# Patient Record
Sex: FEMALE | NOT HISPANIC OR LATINO | Employment: UNEMPLOYED | URBAN - METROPOLITAN AREA
[De-identification: names, ages, dates, MRNs, and addresses within clinical notes are randomized per-mention and may not be internally consistent; named-entity substitution may affect disease eponyms.]

---

## 2017-01-03 ENCOUNTER — ROUTINE PRENATAL (OUTPATIENT)
Dept: OBSTETRICS AND GYNECOLOGY | Facility: CLINIC | Age: 27
End: 2017-01-03

## 2017-01-03 VITALS — BODY MASS INDEX: 28.74 KG/M2 | SYSTOLIC BLOOD PRESSURE: 106 MMHG | WEIGHT: 189 LBS | DIASTOLIC BLOOD PRESSURE: 64 MMHG

## 2017-01-03 DIAGNOSIS — Z34.83 NORMAL PREGNANCY IN MULTIGRAVIDA IN THIRD TRIMESTER: Primary | ICD-10-CM

## 2017-01-03 LAB — EXTERNAL GROUP B STREP ANTIGEN: NORMAL

## 2017-01-03 PROCEDURE — 99213 OFFICE O/P EST LOW 20 MIN: CPT | Performed by: OBSTETRICS & GYNECOLOGY

## 2017-01-03 NOTE — PROGRESS NOTES
No major complaints.  GBS performed today.  Discussed labor signs.  Return to the office in 1 week to see Dr. Dominguez or Dr. Ch.  Return to the office in 2 weeks to see me.  I spent 12 out of 15 minutes with the patient in face to face counseling of the above issues.

## 2017-01-03 NOTE — MR AVS SNAPSHOT
Sania Monroe   1/3/2017 11:00 AM   Routine Prenatal    Dept Phone:  [phone]   Encounter #:  [id number]    Provider:  Olegario Dumont MD   Department:  [facility] OB GYN                Your Full Care Plan              Your Updated Medication List          This list is accurate as of: 1/3/17 11:19 AM.  Always use your most recent med list.                DOCQLACE 100 MG capsule   Generic drug:  docusate sodium       Omeprazole 20 MG tablet delayed-release   Take 20 mg by mouth daily.       Prenatal Vitamins 28-0.8 MG tablet               We Performed the Following     Group B Strep Culture       You Were Diagnosed With        Codes Comments    Normal pregnancy in multigravida in third trimester    -  Primary ICD-10-CM: Z34.83  ICD-9-CM: V22.1       Instructions     None    Patient Instructions History      Upcoming Appointments     Visit Type Date Time Department    OB FOLLOWUP 1/3/2017 11:00 AM [facility] Taiwanese    OB FOLLOWUP 2017  1:15 PM [facility] L[facility] Taiwanese    OB FOLLOWUP 2017  1:45 PM [facility] Taiwanese      [employer] Signup     [facility] Oncothyreon allows you to send messages to your doctor, view your test results, renew your prescriptions, schedule appointments, and more. To sign up, go to [employer] and click on the Sign Up Now link in the New User? box. Enter your Oncothyreon Activation Code exactly as it appears below along with the last four digits of your Social Security Number and your Date of Birth () to complete the sign-up process. If you do not sign up before the expiration date, you must request a new code.    Oncothyreon Activation Code: [email or web address]  Expires: 2017  1:39 PM    If you have questions, you can email [email or web address] or call [phone] to talk to our Oncothyreon staff. Remember, Oncothyreon is NOT to be used for urgent needs. For medical emergencies, dial 911.                  Other Info from Your Visit           Your Appointments     Jan 11, 2017  1:15 PM EST   OB FOLLOWUP with Magnolia Ch MD   [facility] OB [facility]YN (--)    [address]  [facility] [id number] [phone]            Jan 18, 2017  1:45 PM EST   OB FOLLOWUP with Olegario Dumont MD   [facility] (--)    [address]  [facility] [id number] [phone]              Allergies     No Known Allergies      Vital Signs     Blood Pressure Weight Last Menstrual Period Body Mass Index Smoking Status       106/64 189 lb (85.7 kg) 04/22/2016 28.74 kg/m2 Never Smoker       Problems and Diagnoses Noted     Normal pregnancy in multigravida in third trimester

## 2017-01-07 LAB — B-HEM STREP SPEC QL CULT: NEGATIVE

## 2017-01-11 ENCOUNTER — ROUTINE PRENATAL (OUTPATIENT)
Dept: OBSTETRICS AND GYNECOLOGY | Facility: CLINIC | Age: 27
End: 2017-01-11

## 2017-01-11 VITALS — WEIGHT: 193 LBS | SYSTOLIC BLOOD PRESSURE: 130 MMHG | BODY MASS INDEX: 29.35 KG/M2 | DIASTOLIC BLOOD PRESSURE: 71 MMHG

## 2017-01-11 DIAGNOSIS — Z34.83 NORMAL PREGNANCY IN MULTIGRAVIDA IN THIRD TRIMESTER: Primary | ICD-10-CM

## 2017-01-11 PROCEDURE — 99212 OFFICE O/P EST SF 10 MIN: CPT | Performed by: OBSTETRICS & GYNECOLOGY

## 2017-01-11 NOTE — MR AVS SNAPSHOT
Sania Monroe   2017 1:15 PM   Routine Prenatal    Dept Phone:  [phone]   Encounter #:  [id number]    Provider:  Magnolia Ch MD   Department:  [facility] OB GYN                Your Full Care Plan              Your Updated Medication List          This list is accurate as of: 17  4:13 PM.  Always use your most recent med list.                DOCQLACE 100 MG capsule   Generic drug:  docusate sodium       Omeprazole 20 MG tablet delayed-release   Take 20 mg by mouth daily.       Prenatal Vitamins 28-0.8 MG tablet               You Were Diagnosed With        Codes Comments    Normal pregnancy in multigravida in third trimester    -  Primary ICD-10-CM: Z34.83  ICD-9-CM: V22.1       Instructions     None    Patient Instructions History      Upcoming Appointments     Visit Type Date Time Department    OB FOLLOWUP 2017  1:15 PM [facility]    OB FOLLOWUP 2017  1:45 PM [facility]      [employer] Signup     [facility] DancingAnchovy allows you to send messages to your doctor, view your test results, renew your prescriptions, schedule appointments, and more. To sign up, go to [employer] and click on the Sign Up Now link in the New User? box. Enter your DancingAnchovy Activation Code exactly as it appears below along with the last four digits of your Social Security Number and your Date of Birth () to complete the sign-up process. If you do not sign up before the expiration date, you must request a new code.    DancingAnchovy Activation Code: [email or web address]  Expires: 2017  4:13 PM    If you have questions, you can email [email or web address] or call [phone] to talk to our DancingAnchovy staff. Remember, DancingAnchovy is NOT to be used for urgent needs. For medical emergencies, dial 911.               Other Info from Your Visit           Your Appointments     2017  1:45 PM EST   OB FOLLOWUP with Olegario Gore  MD Tim   [facility] (--)    [address]  [facility] [id number] [phone]              Allergies     No Known Allergies      Vital Signs     Blood Pressure Weight Last Menstrual Period Body Mass Index Smoking Status       130/71 193 lb (87.5 kg) 04/22/2016 29.35 kg/m2 Never Smoker       Problems and Diagnoses Noted     Normal pregnancy in multigravida in third trimester

## 2017-01-11 NOTE — PROGRESS NOTES
Complaining of being uncomfortable.  Discussed normal aches and pains of pregnancy.  Discussed labor precautions with patient.  Reports good fetal movement.  GBS negative.

## 2017-01-17 ENCOUNTER — HOSPITAL ENCOUNTER (OUTPATIENT)
Facility: HOSPITAL | Age: 27
Setting detail: OBSERVATION
Discharge: HOME | End: 2017-01-17
Attending: OBSTETRICS & GYNECOLOGY | Admitting: OBSTETRICS & GYNECOLOGY

## 2017-01-17 VITALS
HEART RATE: 74 BPM | BODY MASS INDEX: 29.46 KG/M2 | DIASTOLIC BLOOD PRESSURE: 75 MMHG | WEIGHT: 194.4 LBS | HEIGHT: 68 IN | RESPIRATION RATE: 16 BRPM | TEMPERATURE: 98.4 F | OXYGEN SATURATION: 99 % | SYSTOLIC BLOOD PRESSURE: 122 MMHG

## 2017-01-17 PROBLEM — Z34.90 PREGNANCY: Status: ACTIVE | Noted: 2017-01-17

## 2017-01-17 PROCEDURE — 59025 FETAL NON-STRESS TEST: CPT

## 2017-01-17 PROCEDURE — G0378 HOSPITAL OBSERVATION PER HR: HCPCS

## 2017-01-17 PROCEDURE — 59025 FETAL NON-STRESS TEST: CPT | Performed by: OBSTETRICS & GYNECOLOGY

## 2017-01-17 NOTE — NON STRESS TEST
Sania Monroe, a  at 38w4d with an MERARY of 2017, by Other Basis, was seen at [facility] DELIVERY for a nonstress test.    Chief Complaint   Patient presents with   • Vaginal Bleeding     bright red bleeding since yesterday       Interpretation A  Nonstress Test Interpretation A: Reactive (17 1230 : Columba Borja RN)

## 2017-01-17 NOTE — NURSING NOTE
Patient provided discharge instructions, and verbalizes understanding. Aware to return to triage for changes or concerns. Encouraged to keep prenatal appointment tomorrow. Labor precautions reviewed. Patient appears comfortable and ambulated off unit.

## 2017-01-17 NOTE — IP AVS SNAPSHOT
AFTER VISIT SUMMARY             Sania Monroe           About your hospitalization     You were discharged on:  January 17, 2017 You last received care in the:  [facility] LAB[facility] DELIVERY     Unit phone number:  [phone]       Medications    If you or your caregiver advised us that you are currently taking a medication and that medication is marked below as “Resume”, this simply indicates that we have reviewed those medications to make sure our new therapy recommendations do not interfere.  If you have concerns about medications other than those new ones which we are prescribing today, please consult the physician who prescribed them (or your primary physician).  Our review of your home medications is not meant to indicate that we are directing their use.             Your Medications      ASK your doctor about these medications           Morning Noon Evening Bedtime As Needed    DOCQLACE 100 MG capsule   Generic drug:  docusate sodium                                Omeprazole 20 MG tablet delayed-release   Take 20 mg by mouth daily.                                Prenatal Vitamins 28-0.8 MG tablet                                         Instructions for After Discharge        Scheduled Appointments     Jan 18, 2017  1:45 PM EST   OB FOLLOWUP with Olegario Dumont MD   [facility] OB GYN (--)    [address]  [facility] [id number] [phone]               Relevant Prenatal Information          Facts About Your Prenatal Visit (All Dating Information Is Approximate)     Due Date How Far Along Am I? Weight Gain Since Prior Visit (1/11/2017)       1/27/2017 38 weeks 4 days 1.4 lb (0.6 kg)       Vitals     Blood Pressure Weight                122/75 194.4 lb (88.2 kg)           Summary of Your Hospitalization        Reason for Hospitalization     Your primary diagnosis was:  Not on File    Your diagnoses also included:  Pregnancy      Care Providers      Provider Service Role Specialty    Olegario Dumont MD Obstetrics Attending Provider Obstetrics and Gynecology      Your Allergies  Date Reviewed: 2017    No active allergies      RADLIVE Signup     [employer] allows you to send messages to your doctor, view your test results, renew your prescriptions, schedule appointments, and more. To sign up, go to [employer] and click on the Sign Up Now link in the New User? box. Enter your RADLIVE Activation Code exactly as it appears below along with the last four digits of your Social Security Number and your Date of Birth () to complete the sign-up process. If you do not sign up before the expiration date, you must request a new code.    RADLIVE Activation Code: [email or web address]  Expires: 2017  4:13 PM    If you have questions, you can email [email or web address] or call [phone] to talk to our RADLIVE staff. Remember, RADLIVE is NOT to be used for urgent needs. For medical emergencies, dial 911.          Information Regarding Fetal Kick Counts     Fetal Movement Counts  Patient Name: __________________________________________________   Patient Due Date: ____________________  Performing a fetal movement count is highly recommended in high-risk pregnancies, but it is good for every pregnant woman to do. Your caregiver may ask you to start counting fetal movements at 28 weeks of the pregnancy. Fetal movements often increase:  · After eating a full meal.  · After physical activity.  · After eating or drinking something sweet or cold.  · At rest.  Pay attention to when you feel the baby is most active. This will help you notice a pattern of your baby's sleep and wake cycles and what factors contribute to an increase in fetal movement. It is important to perform a fetal movement count at the same time each day when your baby is normally most active.    HOW TO COUNT FETAL MOVEMENTS  1. Find a quiet and  comfortable area to sit or lie down on your left side. Lying on your left side provides the best blood and oxygen circulation to your baby.  2. Write down the day and time on a sheet of paper or in a journal.  3. Start counting kicks, flutters, swishes, rolls, or jabs in a 2 hour period. You should feel at least 10 movements within 2 hours.  4. If you do not feel 10 movements in 2 hours, wait 2-3 hours and count again. Look for a change in the pattern or not enough counts in 2 hours.  SEEK MEDICAL CARE IF:  · You feel less than 10 counts in 2 hours, tried twice.  · There is no movement in over an hour.  · The pattern is changing or taking longer each day to reach 10 counts in 2 hours.  · You feel the baby is not moving as he or she usually does.    Date  Movements  Start Time  Finish Time    Date  Movements  Start Time  Finish Time    Date  Movements  Start Time  Finish Time    Date  Movements  Start Time  Finish Time    Date  Movements  Start Time  Finish Time    Date  Movements  Start Time  Finish Time      Date  Movements  Start Time  Finish Time    Date  Movements  Start Time  Finish Time    Date  Movements  Start Time  Finish Time    Date  Movements  Start Time  Finish Time    Date  Movements  Start Time  Finish Time    Date  Movements  Start Time  Finish Time      Date  Movements  Start Time  Finish Time    Date  Movements  Start Time  Finish Time    Date  Movements  Start Time  Finish Time    Date  Movements  Start Time  Finish Time    Date  Movements  Start Time  Finish Time    Date  Movements  Start Time  Finish Time    Date  Movements  Start Time  Finish Time    Date  Movements  Start Time  Finish Time    Date  Movements  Start Time  Finish Time    Date  Movements  Start Time  Finish Time    Date  Movements  Start Time  Finish Time    Date  Movements  Start Time  Finish Time      Document Released: 01/17/2008   Document Revised: 12/04/2013   Document Reviewed: 10/14/2013    ExitCare® Patient Information  ©2015 [facility], [facility]. This information is not intended to replace advice given to you by your health care provider. Make sure you discuss any questions you have with your health care provider.            More Information      Third Trimester of Pregnancy  The third trimester is from week 29 through week 42, months 7 through 9. This trimester is when your unborn baby (fetus) is growing very fast. At the end of the ninth month, the unborn baby is about 20 inches in length. It weighs about 6-10 pounds.   HOME CARE   · Avoid all smoking, herbs, and alcohol. Avoid drugs not approved by your doctor.  · Do not use any tobacco products, including cigarettes, chewing tobacco, and electronic cigarettes. If you need help quitting, ask your doctor. You may get counseling or other support to help you quit.  · Only take medicine as told by your doctor. Some medicines are safe and some are not during pregnancy.  · Exercise only as told by your doctor. Stop exercising if you start having cramps.  · Eat regular, healthy meals.  · Wear a good support bra if your breasts are tender.  · Do not use hot tubs, steam rooms, or saunas.  · Wear your seat belt when driving.  · Avoid raw meat, uncooked cheese, and liter boxes and soil used by cats.  · Take your prenatal vitamins.  · Take 2216-0026 milligrams of calcium daily starting at the 20th week of pregnancy until you deliver your baby.  · Try taking medicine that helps you poop (stool softener) as needed, and if your doctor approves. Eat more fiber by eating fresh fruit, vegetables, and whole grains. Drink enough fluids to keep your pee (urine) clear or pale yellow.  · Take warm water baths (sitz baths) to soothe pain or discomfort caused by hemorrhoids. Use hemorrhoid cream if your doctor approves.  · If you have puffy, bulging veins (varicose veins), wear support hose. Raise (elevate) your feet for 15 minutes, 3-4 times a day. Limit salt in your diet.  · Avoid heavy lifting, wear low  heels, and sit up straight.  · Rest with your legs raised if you have leg cramps or low back pain.  · Visit your dentist if you have not gone during your pregnancy. Use a soft toothbrush to brush your teeth. Be gentle when you floss.  · You can have sex (intercourse) unless your doctor tells you not to.  · Do not travel far distances unless you must. Only do so with your doctor's approval.  · Take prenatal classes.  · Practice driving to the hospital.  · Pack your hospital bag.  · Prepare the baby's room.  · Go to your doctor visits.  GET HELP IF:  · You are not sure if you are in labor or if your water has broken.  · You are dizzy.  · You have mild cramps or pressure in your lower belly (abdominal).  · You have a nagging pain in your belly area.  · You continue to feel sick to your stomach (nauseous), throw up (vomit), or have watery poop (diarrhea).  · You have bad smelling fluid coming from your vagina.  · You have pain with peeing (urination).  GET HELP RIGHT AWAY IF:   · You have a fever.  · You are leaking fluid from your vagina.  · You are spotting or bleeding from your vagina.  · You have severe belly cramping or pain.  · You lose or gain weight rapidly.  · You have trouble catching your breath and have chest pain.  · You notice sudden or extreme puffiness (swelling) of your face, hands, ankles, feet, or legs.  · You have not felt the baby move in over an hour.  · You have severe headaches that do not go away with medicine.  · You have vision changes.     This information is not intended to replace advice given to you by your health care provider. Make sure you discuss any questions you have with your health care provider.     Document Released: 03/14/2011 Document Revised: 01/08/2016 Document Reviewed: 02/18/2014  [employer] Interactive Patient Education ©2016 [employer]          Vaginal Bleeding During Pregnancy, Third Trimester   A small amount of bleeding (spotting) from the vagina is common in  pregnancy. Sometimes the bleeding is normal and is not a problem, and sometimes it is a sign of something serious. Be sure to tell your doctor about any bleeding from your vagina right away.  HOME CARE  · Watch your condition for any changes.  · Follow your doctor's instructions about how active you can be.  · If you are on bed rest:    You may need to stay in bed and only get up to use the bathroom.    You may be allowed to do some activities.    If you need help, make plans for someone to help you.  · Write down:    The number of pads you use each day.    How often you change pads.    How soaked (saturated) your pads are.  · Do not use tampons.  · Do not douche.  · Do not have sex or orgasms until your doctor says it is okay.  · Follow your doctor's advice about lifting, driving, and doing physical activities.  · If you pass any tissue from your vagina, save the tissue so you can show it to your doctor.  · Only take medicines as told by your doctor.  · Do not take aspirin because it can make you bleed.  · Keep all follow-up visits as told by your doctor.  GET HELP IF:   · You bleed from your vagina.  · You have cramps.  · You have labor pains.  · You have a fever that does not go away after you take medicine.  GET HELP RIGHT AWAY IF:  · You have very bad cramps in your back or belly (abdomen).  · You have chills.  · You have a gush of fluid from your vagina.  · You pass large clots or tissue from your vagina.  · You bleed more.  · You feel light-headed or weak.  · You pass out (faint).  · You do not feel your baby move around as much as before.  MAKE SURE YOU:  · Understand these instructions.  · Will watch your condition.  · Will get help right away if you are not doing well or get worse.     This information is not intended to replace advice given to you by your health care provider. Make sure you discuss any questions you have with your health care provider.     Document Released: 05/04/2015 Document Reviewed:  05/04/2015  [employer] Interactive Patient Education ©2016 [employer]            SYMPTOMS OF A STROKE    Call 911 or have someone take you to the Emergency Department if you have any of the following:    · Sudden numbness or weakness of your face, arm or leg especially on one side of the body  · Sudden confusion, diffiiculty speaking or trouble understanding   · Changes in your vision or loss of sight in one eye  · Sudden severe headache with no known cause  · sudden dizziness, trouble walking, loss of balance or coordination    It is important to seek emergency care right away if you have further stroke symptoms. If you get emergency help quickly, the powerful clot-dissolving medicines can reduce the disabilities caused by a stroke.     For more information:    American Stroke Association  0-783-5-STROKE  www.strokeassociation.org           IF YOU SMOKE OR USE TOBACCO PLEASE READ THE FOLLOWING:    Why is smoking bad for me?  Smoking increases the risk of heart disease, lung disease, vascular disease, stroke, and cancer.     If you smoke, STOP!    If you would like more information on quitting smoking, please visit the [employer] website: [email or web address]/[email or web address]/healthier-together/smoke   This link will provide additional resources including the QUIT line and the Beat the Pack support groups.     For more information:    American Cancer Society  ([phone] 300-3068    American Heart Association  1-210.769.9395               YOU ARE THE MOST IMPORTANT FACTOR IN YOUR RECOVERY.     Follow all instructions carefully.     I have reviewed my discharge instructions with my nurse, including the following information, if applicable:     Information about my illness and diagnosis   Follow up appointments (including lab draws)   Wound Care   Equipment Needs   Medications (new and continuing) along with side effects   Preventative information such as vaccines and smoking cessations   Diet   Pain   I know when to  contact my Doctor's office or seek emergency care      I want my nurse to describe the side effects of my medications: YES NO   If the answer is no, I understand the side effects of my medications: YES NO   My nurse described the side effects of my medications in a way that I could understand: YES NO   I have taken my personal belongings and my own medications with me at discharge: YES NO            Should a home visit be schedule with you:  a notification from your provider will be made prior to the visit.  If you have any questions or concerns about the visit, contact your provider.      I have received this information and my questions have been answered. I have discussed any concerns I see with this plan with the nurse or physician. I understand these instructions.    Signature of Patient or Responsible Person: _____________________________________    Date: _________________  Time: __________________    Signature of Healthcare Provider: _______________________________________  Date: _________________  Time: __________________          Additional Information     I have reviewed the patient condition and status with the provider and a discharge order was obtained.  I hereby state that the patient has been examined and observed for a reasonable period of time and I certify that the patient is in false labor.    RN Name ___________________________________________    Date and Time _______________________________________

## 2017-01-18 ENCOUNTER — ANESTHESIA (OUTPATIENT)
Dept: LABOR AND DELIVERY | Facility: HOSPITAL | Age: 27
End: 2017-01-18

## 2017-01-18 ENCOUNTER — HOSPITAL ENCOUNTER (INPATIENT)
Facility: HOSPITAL | Age: 27
LOS: 2 days | Discharge: HOME | End: 2017-01-20
Attending: OBSTETRICS & GYNECOLOGY | Admitting: OBSTETRICS & GYNECOLOGY

## 2017-01-18 ENCOUNTER — ANESTHESIA EVENT (OUTPATIENT)
Dept: LABOR AND DELIVERY | Facility: HOSPITAL | Age: 27
End: 2017-01-18

## 2017-01-18 LAB
ABO GROUP BLD: NORMAL
BASOPHILS # BLD AUTO: 0.03 10*3/MM3 (ref 0–0.2)
BASOPHILS NFR BLD AUTO: 0.2 % (ref 0–1.5)
BLD GP AB SCN SERPL QL: NEGATIVE
DEPRECATED RDW RBC AUTO: 44.8 FL (ref 37–54)
EOSINOPHIL # BLD AUTO: 0.11 10*3/MM3 (ref 0–0.7)
EOSINOPHIL NFR BLD AUTO: 0.6 % (ref 0.3–6.2)
ERYTHROCYTE [DISTWIDTH] IN BLOOD BY AUTOMATED COUNT: 12.6 % (ref 11.7–13)
HCT VFR BLD AUTO: 37 % (ref 35.6–45.5)
HGB BLD-MCNC: 12.3 G/DL (ref 11.9–15.5)
IMM GRANULOCYTES # BLD: 0.08 10*3/MM3 (ref 0–0.03)
IMM GRANULOCYTES NFR BLD: 0.4 % (ref 0–0.5)
LYMPHOCYTES # BLD AUTO: 1.82 10*3/MM3 (ref 0.9–4.8)
LYMPHOCYTES NFR BLD AUTO: 9.7 % (ref 19.6–45.3)
MCH RBC QN AUTO: 32.6 PG (ref 26.9–32)
MCHC RBC AUTO-ENTMCNC: 33.2 G/DL (ref 32.4–36.3)
MCV RBC AUTO: 98.1 FL (ref 80.5–98.2)
MONOCYTES # BLD AUTO: 1.61 10*3/MM3 (ref 0.2–1.2)
MONOCYTES NFR BLD AUTO: 8.6 % (ref 5–12)
NEUTROPHILS # BLD AUTO: 15.05 10*3/MM3 (ref 1.9–8.1)
NEUTROPHILS NFR BLD AUTO: 80.5 % (ref 42.7–76)
PLATELET # BLD AUTO: 260 10*3/MM3 (ref 140–500)
PMV BLD AUTO: 9.9 FL (ref 6–12)
RBC # BLD AUTO: 3.77 10*6/MM3 (ref 3.9–5.2)
RH BLD: POSITIVE
WBC NRBC COR # BLD: 18.7 10*3/MM3 (ref 4.5–10.7)

## 2017-01-18 PROCEDURE — 85025 COMPLETE CBC W/AUTO DIFF WBC: CPT | Performed by: OBSTETRICS & GYNECOLOGY

## 2017-01-18 PROCEDURE — 86850 RBC ANTIBODY SCREEN: CPT

## 2017-01-18 PROCEDURE — 0UQMXZZ REPAIR VULVA, EXTERNAL APPROACH: ICD-10-PCS | Performed by: OBSTETRICS & GYNECOLOGY

## 2017-01-18 PROCEDURE — C1755 CATHETER, INTRASPINAL: HCPCS

## 2017-01-18 PROCEDURE — 25010000002 BUTORPHANOL PER 1 MG: Performed by: OBSTETRICS & GYNECOLOGY

## 2017-01-18 PROCEDURE — 86900 BLOOD TYPING SEROLOGIC ABO: CPT

## 2017-01-18 PROCEDURE — 0HQ9XZZ REPAIR PERINEUM SKIN, EXTERNAL APPROACH: ICD-10-PCS | Performed by: OBSTETRICS & GYNECOLOGY

## 2017-01-18 PROCEDURE — 59409 OBSTETRICAL CARE: CPT | Performed by: OBSTETRICS & GYNECOLOGY

## 2017-01-18 PROCEDURE — 86901 BLOOD TYPING SEROLOGIC RH(D): CPT

## 2017-01-18 RX ORDER — IBUPROFEN 600 MG/1
600 TABLET ORAL EVERY 8 HOURS PRN
Status: DISCONTINUED | OUTPATIENT
Start: 2017-01-18 | End: 2017-01-20 | Stop reason: HOSPADM

## 2017-01-18 RX ORDER — SODIUM CHLORIDE 0.9 % (FLUSH) 0.9 %
1-10 SYRINGE (ML) INJECTION AS NEEDED
Status: DISCONTINUED | OUTPATIENT
Start: 2017-01-18 | End: 2017-01-18 | Stop reason: HOSPADM

## 2017-01-18 RX ORDER — LIDOCAINE HYDROCHLORIDE AND EPINEPHRINE 15; 5 MG/ML; UG/ML
INJECTION, SOLUTION EPIDURAL AS NEEDED
Status: DISCONTINUED | OUTPATIENT
Start: 2017-01-18 | End: 2017-01-18 | Stop reason: SURG

## 2017-01-18 RX ORDER — PRENATAL VIT NO.126/IRON/FOLIC 28MG-0.8MG
1 TABLET ORAL DAILY
Status: DISCONTINUED | OUTPATIENT
Start: 2017-01-18 | End: 2017-01-20 | Stop reason: HOSPADM

## 2017-01-18 RX ORDER — SODIUM CHLORIDE 0.9 % (FLUSH) 0.9 %
1-10 SYRINGE (ML) INJECTION AS NEEDED
Status: DISCONTINUED | OUTPATIENT
Start: 2017-01-18 | End: 2017-01-20 | Stop reason: HOSPADM

## 2017-01-18 RX ORDER — MISOPROSTOL 200 UG/1
600 TABLET ORAL ONCE
Status: DISCONTINUED | OUTPATIENT
Start: 2017-01-18 | End: 2017-01-20 | Stop reason: HOSPADM

## 2017-01-18 RX ORDER — HYDROCODONE BITARTRATE AND ACETAMINOPHEN 5; 325 MG/1; MG/1
1 TABLET ORAL EVERY 4 HOURS PRN
Status: DISCONTINUED | OUTPATIENT
Start: 2017-01-18 | End: 2017-01-20 | Stop reason: HOSPADM

## 2017-01-18 RX ORDER — ONDANSETRON 2 MG/ML
4 INJECTION INTRAMUSCULAR; INTRAVENOUS EVERY 6 HOURS PRN
Status: DISCONTINUED | OUTPATIENT
Start: 2017-01-18 | End: 2017-01-20 | Stop reason: HOSPADM

## 2017-01-18 RX ORDER — FAMOTIDINE 10 MG/ML
20 INJECTION, SOLUTION INTRAVENOUS ONCE AS NEEDED
Status: DISCONTINUED | OUTPATIENT
Start: 2017-01-18 | End: 2017-01-18 | Stop reason: HOSPADM

## 2017-01-18 RX ORDER — DIPHENHYDRAMINE HYDROCHLORIDE 50 MG/ML
12.5 INJECTION INTRAMUSCULAR; INTRAVENOUS EVERY 8 HOURS PRN
Status: DISCONTINUED | OUTPATIENT
Start: 2017-01-18 | End: 2017-01-18 | Stop reason: HOSPADM

## 2017-01-18 RX ORDER — OXYTOCIN/RINGER'S LACTATE 10/500ML
999 PLASTIC BAG, INJECTION (ML) INTRAVENOUS ONCE
Status: DISCONTINUED | OUTPATIENT
Start: 2017-01-18 | End: 2017-01-18 | Stop reason: HOSPADM

## 2017-01-18 RX ORDER — CALCIUM CARBONATE 200(500)MG
2 TABLET,CHEWABLE ORAL 3 TIMES DAILY PRN
Status: DISCONTINUED | OUTPATIENT
Start: 2017-01-18 | End: 2017-01-20 | Stop reason: HOSPADM

## 2017-01-18 RX ORDER — BISACODYL 10 MG
10 SUPPOSITORY, RECTAL RECTAL DAILY PRN
Status: DISCONTINUED | OUTPATIENT
Start: 2017-01-19 | End: 2017-01-20 | Stop reason: HOSPADM

## 2017-01-18 RX ORDER — OXYTOCIN/RINGER'S LACTATE 10/500ML
125 PLASTIC BAG, INJECTION (ML) INTRAVENOUS CONTINUOUS PRN
Status: DISCONTINUED | OUTPATIENT
Start: 2017-01-18 | End: 2017-01-18 | Stop reason: HOSPADM

## 2017-01-18 RX ORDER — ONDANSETRON 2 MG/ML
4 INJECTION INTRAMUSCULAR; INTRAVENOUS ONCE AS NEEDED
Status: DISCONTINUED | OUTPATIENT
Start: 2017-01-18 | End: 2017-01-18 | Stop reason: HOSPADM

## 2017-01-18 RX ORDER — LANOLIN 100 %
OINTMENT (GRAM) TOPICAL
Status: DISCONTINUED | OUTPATIENT
Start: 2017-01-18 | End: 2017-01-20 | Stop reason: HOSPADM

## 2017-01-18 RX ORDER — SODIUM CHLORIDE, SODIUM LACTATE, POTASSIUM CHLORIDE, CALCIUM CHLORIDE 600; 310; 30; 20 MG/100ML; MG/100ML; MG/100ML; MG/100ML
125 INJECTION, SOLUTION INTRAVENOUS CONTINUOUS
Status: DISCONTINUED | OUTPATIENT
Start: 2017-01-18 | End: 2017-01-20

## 2017-01-18 RX ORDER — ZOLPIDEM TARTRATE 5 MG/1
5 TABLET ORAL NIGHTLY PRN
Status: DISCONTINUED | OUTPATIENT
Start: 2017-01-18 | End: 2017-01-20 | Stop reason: HOSPADM

## 2017-01-18 RX ORDER — BUTORPHANOL TARTRATE 1 MG/ML
1 INJECTION, SOLUTION INTRAMUSCULAR; INTRAVENOUS ONCE AS NEEDED
Status: COMPLETED | OUTPATIENT
Start: 2017-01-18 | End: 2017-01-18

## 2017-01-18 RX ORDER — DOCUSATE SODIUM 100 MG/1
100 CAPSULE, LIQUID FILLED ORAL 2 TIMES DAILY
Status: DISCONTINUED | OUTPATIENT
Start: 2017-01-18 | End: 2017-01-20 | Stop reason: HOSPADM

## 2017-01-18 RX ORDER — ACETAMINOPHEN 325 MG/1
650 TABLET ORAL EVERY 4 HOURS PRN
Status: DISCONTINUED | OUTPATIENT
Start: 2017-01-18 | End: 2017-01-18 | Stop reason: HOSPADM

## 2017-01-18 RX ORDER — OXYTOCIN/RINGER'S LACTATE 10/500ML
2 PLASTIC BAG, INJECTION (ML) INTRAVENOUS
Status: DISCONTINUED | OUTPATIENT
Start: 2017-01-18 | End: 2017-01-20

## 2017-01-18 RX ORDER — ONDANSETRON 4 MG/1
4 TABLET, FILM COATED ORAL EVERY 8 HOURS PRN
Status: DISCONTINUED | OUTPATIENT
Start: 2017-01-18 | End: 2017-01-20 | Stop reason: HOSPADM

## 2017-01-18 RX ORDER — LIDOCAINE HYDROCHLORIDE 10 MG/ML
5 INJECTION, SOLUTION INFILTRATION; PERINEURAL AS NEEDED
Status: DISCONTINUED | OUTPATIENT
Start: 2017-01-18 | End: 2017-01-18 | Stop reason: HOSPADM

## 2017-01-18 RX ADMIN — HYDROCODONE BITARTRATE AND ACETAMINOPHEN 1 TABLET: 5; 325 TABLET ORAL at 21:19

## 2017-01-18 RX ADMIN — Medication 10 ML/HR: at 06:01

## 2017-01-18 RX ADMIN — BUTORPHANOL TARTRATE 1 MG: 1 INJECTION, SOLUTION INTRAMUSCULAR; INTRAVENOUS at 05:01

## 2017-01-18 RX ADMIN — DOCUSATE SODIUM 100 MG: 100 CAPSULE, LIQUID FILLED ORAL at 12:52

## 2017-01-18 RX ADMIN — LIDOCAINE HYDROCHLORIDE AND EPINEPHRINE 2 ML: 15; 5 INJECTION, SOLUTION EPIDURAL at 05:56

## 2017-01-18 RX ADMIN — Medication: at 12:52

## 2017-01-18 RX ADMIN — SODIUM CHLORIDE, POTASSIUM CHLORIDE, SODIUM LACTATE AND CALCIUM CHLORIDE 1000 ML: 600; 310; 30; 20 INJECTION, SOLUTION INTRAVENOUS at 04:54

## 2017-01-18 RX ADMIN — Medication 1 TABLET: at 12:52

## 2017-01-18 RX ADMIN — LIDOCAINE HYDROCHLORIDE AND EPINEPHRINE 3 ML: 15; 5 INJECTION, SOLUTION EPIDURAL at 05:53

## 2017-01-18 RX ADMIN — OXYTOCIN 2 MILLI-UNITS/MIN: 10 INJECTION, SOLUTION INTRAMUSCULAR; INTRAVENOUS at 06:20

## 2017-01-18 RX ADMIN — HYDROCODONE BITARTRATE AND ACETAMINOPHEN 1 TABLET: 5; 325 TABLET ORAL at 17:03

## 2017-01-18 RX ADMIN — HYDROCORTISONE 2.5% 1 APPLICATION: 25 CREAM TOPICAL at 12:52

## 2017-01-18 RX ADMIN — IBUPROFEN 600 MG: 600 TABLET ORAL at 21:19

## 2017-01-18 RX ADMIN — SODIUM CHLORIDE, POTASSIUM CHLORIDE, SODIUM LACTATE AND CALCIUM CHLORIDE 125 ML/HR: 600; 310; 30; 20 INJECTION, SOLUTION INTRAVENOUS at 05:51

## 2017-01-18 RX ADMIN — HYDROCODONE BITARTRATE AND ACETAMINOPHEN 1 TABLET: 5; 325 TABLET ORAL at 12:52

## 2017-01-18 RX ADMIN — BENZOCAINE AND MENTHOL: 20; .5 SPRAY TOPICAL at 12:51

## 2017-01-18 RX ADMIN — OXYTOCIN 125 ML/HR: 10 INJECTION, SOLUTION INTRAMUSCULAR; INTRAVENOUS at 10:29

## 2017-01-18 RX ADMIN — IBUPROFEN 600 MG: 600 TABLET ORAL at 12:52

## 2017-01-18 NOTE — H&P
Patient is a 26 y.o. female admitted for active labor at 38 weeks.  Prenatal care uncomplicated.  Patient Active Problem List   Diagnosis   • Normal pregnancy in multigravida in third trimester   • Pregnancy headache in second trimester   • Nausea/vomiting in pregnancy   • Needs flu shot   • Need for DTaP vaccination   • Cold sore   • Pregnancy     Prenatal care is uncomplicated.    No past medical history on file.    History reviewed. No pertinent past surgical history.    No Known Allergies    Social History     Social History   • Marital status: Single     Spouse name: N/A   • Number of children: N/A   • Years of education: N/A     Occupational History   • Not on file.     Social History Main Topics   • Smoking status: Never Smoker   • Smokeless tobacco: Never Used   • Alcohol use No   • Drug use: No   • Sexual activity: Yes     Partners: Male     Birth control/ protection: None     Other Topics Concern   • Not on file     Social History Narrative       Physical Exam  Gen: Alert and comfortable.  Vitals:    01/18/17 1110   BP:    Pulse:    Resp: 17   Temp:    SpO2:      HEENT: WNL  Abdomen: Gravid.  EFW 7lbs  Dilation Complete Date: 01/18/17  Dilation Complete Time: 0945  FHT: Category 1    Assessment/Plan: IUP at 38 weeks  - I arrived at second stage of labor.  History reviewed and patient to begin pushing.    Yohannes Dominguez MD

## 2017-01-18 NOTE — LACTATION NOTE
P2. Term infant. Pt states breastfeeding did not work out with first child. Does not feel like she has any milk. Discussed colostrum and supply and demand. Pt states understanding. Requests pump. Hand pump given and instructed on use. Infant sleeping soundly and has just eaten 15cc formula. Rx for personal pump given.

## 2017-01-18 NOTE — IP AVS SNAPSHOT
AFTER VISIT SUMMARY             Sania Monroe           About your hospitalization     You were admitted on:  January 18, 2017 You last received care in the:  [address]       Procedures & Surgeries         Medications    If you or your caregiver advised us that you are currently taking a medication and that medication is marked below as “Resume”, this simply indicates that we have reviewed those medications to make sure our new therapy recommendations do not interfere.  If you have concerns about medications other than those new ones which we are prescribing today, please consult the physician who prescribed them (or your primary physician).  Our review of your home medications is not meant to indicate that we are directing their use.             Your Medications      START taking these medications     HYDROcodone-acetaminophen 5-325 MG per tablet   Take 1 tablet by mouth Every 4 (Four) Hours As Needed for moderate pain (4-6) for up to 8 days.   Last time this was given:  1/20/2017 10:03 AM   Commonly known as:  NORCO             CONTINUE taking these medications     DOCQLACE 100 MG capsule   Last time this was given:  1/20/2017 10:09 AM   Generic drug:  docusate sodium           Omeprazole 20 MG tablet delayed-release   Take 20 mg by mouth daily.           Prenatal Vitamins 28-0.8 MG tablet   Last time this was given:  1/19/2017 10:33 AM                Where to Get Your Medications      You can get these medications from any pharmacy     Bring a paper prescription for each of these medications     HYDROcodone-acetaminophen 5-325 MG per tablet                  Your Medications      Your Medication List           Morning Noon Evening Bedtime As Needed    DOCQLACE 100 MG capsule   Generic drug:  docusate sodium                                HYDROcodone-acetaminophen 5-325 MG per tablet   Take 1 tablet by mouth Every 4 (Four) Hours As Needed for moderate pain (4-6) for up to 8 days.      Commonly known as:  NORCO                                Omeprazole 20 MG tablet delayed-release   Take 20 mg by mouth daily.                                Prenatal Vitamins 28-0.8 MG tablet                                         Instructions for After Discharge        Activity Instructions     Pelvic Rest                 Diet Instructions     Diet:       Diet Texture / Consistency:  Regular             Discharge References/Attachments     BREASTFEEDING CHALLENGES AND SOLUTIONS (ENGLISH)    POSTPARTUM CARE AFTER VAGINAL DELIVERY (ENGLISH)    POSTPARTUM DEPRESSION AND BABY BLUES (ENGLISH)    IBUPROFEN TABLETS AND CAPSULES (ENGLISH)    ACETAMINOPHEN; HYDROCODONE TABLETS OR CAPSULES (ENGLISH)       Follow-ups for After Discharge        Follow-up Information     Follow up with RAMILA Charles .    Specialty:  Family Medicine    Contact information:    [address] [facility] [address] [phone]        Referrals and Follow-ups to Schedule     Follow-Up    As directed    Followup with Dr. Dumont in 6 weeks             Advisor Client Match Signup     [employer] allows you to send messages to your doctor, view your test results, renew your prescriptions, schedule appointments, and more. To sign up, go to [employer] and click on the Sign Up Now link in the New User? box. Enter your Advisor Client Match Activation Code exactly as it appears below along with the last four digits of your Social Security Number and your Date of Birth () to complete the sign-up process. If you do not sign up before the expiration date, you must request a new code.    Advisor Client Match Activation Code: [email or web address]  Expires: 2017  4:13 PM    If you have questions, you can email [email or web address] or call [phone] to talk to our Advisor Client Match staff. Remember, Advisor Client Match is NOT to be used for urgent needs. For medical emergencies, dial 911.           Summary of Your Hospitalization        Reason for Hospitalization      Your primary diagnosis was:  Vaginal Delivery    Your diagnoses also included:  Pregnancy      Care Providers     Provider Service Role Specialty    Olegario Dumont MD Obstetrics Attending Provider Obstetrics and Gynecology      Your Allergies  Date Reviewed: 1/19/2017    No active allergies      Patient Belongings Returned     Document Return of Belongings Flowsheet     Were the patient bedside belongings sent home?   --   Belongings Retrieved from Security & Sent Home   --    Belongings Sent to Safe   --   Medications Retrieved from Pharmacy & Sent Home   --              More Information      Breastfeeding Challenges and Solutions  Even though breastfeeding is natural, it can be challenging, especially in the first few weeks after childbirth. It is normal for problems to arise when starting to breastfeed your new baby, even if you have  before. This document provides some solutions to the most common breastfeeding challenges.   CHALLENGES AND SOLUTIONS  Challenge--Cracked or Sore Nipples  Cracked or sore nipples are commonly experienced by breastfeeding mothers. Cracked or sore nipples often are caused by inadequate latching (when your baby's mouth attaches to your breast to breastfeed). Soreness can also happen if your baby is not positioned properly at your breast. Although nipple cracking and soreness are common during the first week after birth, nipple pain is never normal. If you experience nipple cracking or soreness that lasts longer than 1 week or nipple pain, call your health care provider or lactation consultant.   Solution  Ensure proper latching and positioning of your baby by following the steps below:  · Find a comfortable place to sit or lie down, with your neck and back well supported.  · Place a pillow or rolled up blanket under your baby to bring him or her to the level of your breast (if you are seated).  · Make sure that your baby's abdomen is facing your  abdomen.  · Gently massage your breast. With your fingertips, massage from your chest wall toward your nipple in a circular motion. This encourages milk flow. You may need to continue this action during the feeding if your milk flows slowly.  · Support your breast with 4 fingers underneath and your thumb above your nipple. Make sure your fingers are well away from your nipple and your baby's mouth.  · Stroke your baby's lips gently with your finger or nipple.  · When your baby's mouth is open wide enough, quickly bring your baby to your breast, placing your entire nipple and as much of the colored area around your nipple (areola) as possible into your baby's mouth.  ¨ More areola should be visible above your baby's upper lip than below the lower lip.  ¨ Your baby's tongue should be between his or her lower gum and your breast.  · Ensure that your baby's mouth is correctly positioned around your nipple (latched). Your baby's lips should create a seal on your breast and be turned out (everted).  · It is common for your baby to suck for about 2-3 minutes in order to start the flow of breast milk.  Signs that your baby has successfully latched on to your nipple include:   · Quietly tugging or quietly sucking without causing you pain.    · Swallowing heard between every 3-4 sucks.    · Muscle movement above and in front of his or her ears with sucking.    Signs that your baby has not successfully latched on to nipple include:   · Sucking sounds or smacking sounds from your baby while nursing.    · Nipple pain.    Ensure that your breasts stay moisturized and healthy by:  · Avoiding the use of soap on your nipples.    · Wearing a supportive bra. Avoid wearing underwire-style bras or tight bras.    · Air drying your nipples for 3-4 minutes after each feeding.    · Using only cotton bra pads to absorb breast milk leakage. Leaking of breast milk between feedings is normal.  Be sure to change the pads if they become soaked  "with milk.  · Using lanolin on your nipples after nursing. Lanolin helps to maintain your skin's normal moisture barrier. If you use pure lanolin you do not need to wash it off before feeding your baby again. Pure lanolin is not toxic to your baby.  You may also hand express a few drops of breast milk and gently massage that milk into your nipples, allowing it to air dry.  Challenge--Breast Engorgement  Breast engorgement is the overfilling of your breasts with breast milk. In the first few weeks after giving birth, you may experience breast engorgement. Breast engorgement can make your breasts throb and feel hard, tightly stretched, warm, and tender. Engorgement peaks about the fifth day after you give birth. Having breast engorgement does not mean you have to stop breastfeeding your baby.  Solution  · Breastfeed when you feel the need to reduce the fullness of your breasts or when your baby shows signs of hunger. This is called \"breastfeeding on demand.\"  · Newborns (babies younger than 4 weeks) often breastfeed every 1-3 hours during the day. You may need to awaken your baby to feed if he or she is asleep at a feeding time.  · Do not allow your baby to sleep longer than 5 hours during the night without a feeding.  · Pump or hand express breast milk before breastfeeding to soften your breast, areola, and nipple.  · Apply warm, moist heat (in the shower or with warm water-soaked hand towels) just before feeding or pumping, or massage your breast before or during breastfeeding. This increases circulation and helps your milk to flow.  · Completely empty your breasts when breastfeeding or pumping. Afterward, wear a snug bra (nursing or regular) or tank top for 1-2 days to signal your body to slightly decrease milk production. Only wear snug bras or tank tops to treat engorgement. Tight bras typically should be avoided by breastfeeding mothers. Once engorgement is relieved, return to wearing regular, loose-fitting " "clothes.  · Apply ice packs to your breasts to lessen the pain from engorgement and relieve swelling, unless the ice is uncomfortable for you.  · Do not delay feedings. Try to relax when it is time to feed your baby. This helps to trigger your \"let-down reflex,\" which releases milk from your breast.  · Ensure your baby is latched on to your breast and positioned properly while breastfeeding.  · Allow your baby to remain at your breast as long as he or she is latched on well and actively sucking. Your baby will let you know when he or she is done breastfeeding by pulling away from your breast or falling asleep.  · Avoid introducing bottles or pacifiers to your baby in the early weeks of breastfeeding. Wait to introduce these things until after resolving any breastfeeding challenges.  · Try to pump your milk on the same schedule as when your baby would breastfeed if you are returning to work or away from home for an extended period.  · Drink plenty of fluids to avoid dehydration, which can eventually put you at greater risk of breast engorgement.  If you follow these suggestions, your engorgement should improve in 24-48 hours. If you are still experiencing difficulty, call your lactation consultant or health care provider.   Challenge--Plugged Milk Ducts  Plugged milk ducts occur when the duct does not drain milk effectively and becomes swollen. Wearing a tight-fitting nursing bra or having difficulty with latching may cause plugged milk ducts. Not drinking enough water (8-10 c [1.9-2.4 L] per day) can contribute to plugged milk ducts. Once a duct has become plugged, hard lumps, soreness, and redness may develop in your breast.   Solution  Do not delay feedings. Feed your baby frequently and try to empty your breasts of milk at each feeding. Try breastfeeding from the affected side first so there is a better chance that the milk will drain completely from that breast. Apply warm, moist towels to your breasts for 5-10 " minutes before feeding. Alternatively, a hot shower right before breastfeeding can provide the moist heat that can encourage milk flow. Gentle massage of the sore area before and during a feeding may also help. Avoid wearing tight clothing or bras that put pressure on your breasts. Wear bras that offer good support to your breasts, but avoid underwire bras. If you have a plugged milk duct and develop a fever, you need to see your health care provider.   Challenge--Mastitis  Mastitis is inflammation of your breast. It usually is caused by a bacterial infection and can cause flu-like symptoms. You may develop redness in your breast and a fever. Often when mastitis occurs, your breast becomes firm, warm, and very painful. The most common causes of mastitis are poor latching, ineffective sucking from your baby, consistent pressure on your breast (possibly from wearing a tight-fitting bra or shirt that restricts the milk flow), unusual stress or fatigue, or missed feedings.   Solution  You will be given antibiotic medicine to treat the infection. It is still important to breastfeed frequently to empty your breasts. Continuing to breastfeed while you recover from mastitis will not harm your baby. Make sure your baby is positioned properly during every feeding. Apply moist heat to your breasts for a few minutes before feeding to help the milk flow and to help your breasts empty more easily.  Challenge--Thrush  Thrush is a yeast infection that can form on your nipples, in your breast, or in your baby's mouth. It causes itching, soreness, burning or stabbing pain, and sometimes a rash.   Solution  You will be given a medicated ointment for your nipples, and your baby will be given a liquid medicine for his or her mouth. It is important that you and your baby are treated at the same time because thrush can be passed between you and your baby. Change disposable nursing pads often. Any bras, towels, or clothing that come in  contact with infected areas of your body or your baby's body need to be washed in very hot water every day. Wash your hands and your baby's hands often. All pacifiers, bottle nipples, or toys your baby puts in his or her mouth should be boiled once a day for 20 minutes. After 1 week of treatment, discard pacifiers and bottle nipples and buy new ones. All breast pump parts that touch the milk need to be boiled for 20 minutes every day.  Challenge--Low Milk Supply  You may not be producing enough milk if your baby is not gaining the proper amount of weight. Breast milk production is based on a supply-and-demand system. Your milk supply depends on how frequently and effectively your baby empties your breast.   Solution  The more you breastfeed and pump, the more breast milk you will produce. It is important that your baby empties at least one of your breasts at each feeding. If this is not happening, then use a breast pump or hand express any milk that remains. This will help to drain as much milk as possible at each feeding. It will also signal your body to produce more milk. If your baby is not emptying your breasts, it may be due to latching, sucking, or positioning problems. If low milk supply continues after addressing these issues, contact your health care provider or a lactation specialist as soon as possible.  Challenge--Inverted or Flat Nipples  Some women have nipples that turn inward instead of protruding outward. Other women have nipples that are flat. Inverted or flat nipples can sometimes make it more difficult for your baby to latch onto your breast.  Solution  You may be given a small device that pulls out inverted nipples. This device should be applied right before your baby is brought to your breast. You can also try using a breast pump for a short time before placing the baby at your breast. The pump can pull your nipple outwards to help your infant latch more easily. The baby's sucking motion will  help the inverted nipple protrude as well.   If you have flat nipples, encourage your baby to latch onto your breast and feed frequently in the early days after birth. This will give your baby practice latching on correctly while your breast is still soft. When your milk supply increases, between the second and fifth day after birth and your breasts become full, your baby will have an easier time latching.   Contact a lactation consultant if you still have concerns. She or he can teach you additional techniques to address breastfeeding problems related to nipple shape and position.   FOR MORE INFORMATION  [employer]: www.llli.org     This information is not intended to replace advice given to you by your health care provider. Make sure you discuss any questions you have with your health care provider.     Document Released: 2007 Document Revised: 2016 Document Reviewed: 2014  [employer] Interactive Patient Education © [employer]          Postpartum Care After Vaginal Delivery  After you deliver your  (postpartum period), the usual stay in the hospital is 24-72 hours. If there were problems with your labor or delivery, or if you have other medical problems, you might be in the hospital longer.   While you are in the hospital, you will receive help and instructions on how to care for yourself and your  during the postpartum period.   While you are in the hospital:  · Be sure to tell your nurses if you have pain or discomfort, as well as where you feel the pain and what makes the pain worse.  · If you had an incision made near your vagina (episiotomy) or if you had some tearing during delivery, the nurses may put ice packs on your episiotomy or tear. The ice packs may help to reduce the pain and swelling.  · If you are breastfeeding, you may feel uncomfortable contractions of your uterus for a couple of weeks. This is normal. The contractions help your uterus get  back to normal size.  · It is normal to have some bleeding after delivery.    For the first 1-3 days after delivery, the flow is red and the amount may be similar to a period.    It is common for the flow to start and stop.    In the first few days, you may pass some small clots. Let your nurses know if you begin to pass large clots or your flow increases.    Do not  flush blood clots down the toilet before having the nurse look at them.    During the next 3-10 days after delivery, your flow should become more watery and pink or brown-tinged in color.    Ten to fourteen days after delivery, your flow should be a small amount of yellowish-white discharge.    The amount of your flow will decrease over the first few weeks after delivery. Your flow may stop in 6-8 weeks. Most women have had their flow stop by 12 weeks after delivery.  · You should change your sanitary pads frequently.  · Wash your hands thoroughly with soap and water for at least 20 seconds after changing pads, using the toilet, or before holding or feeding your .  · You should feel like you need to empty your bladder within the first 6-8 hours after delivery.  · In case you become weak, lightheaded, or faint, call your nurse before you get out of bed for the first time and before you take a shower for the first time.  · Within the first few days after delivery, your breasts may begin to feel tender and full. This is called engorgement. Breast tenderness usually goes away within 48-72 hours after engorgement occurs. You may also notice milk leaking from your breasts. If you are not breastfeeding, do not stimulate your breasts. Breast stimulation can make your breasts produce more milk.  · Spending as much time as possible with your  is very important. During this time, you and your  can feel close and get to know each other. Having your  stay in your room (rooming in) will help to strengthen the bond with your .  It  will give you time to get to know your  and become comfortable caring for your .  · Your hormones change after delivery. Sometimes the hormone changes can temporarily cause you to feel sad or tearful. These feelings should not last more than a few days. If these feelings last longer than that, you should talk to your caregiver.  · If desired, talk to your caregiver about methods of family planning or contraception.  · Talk to your caregiver about immunizations. Your caregiver may want you to have the following immunizations before leaving the hospital:    Tetanus, diphtheria, and pertussis (Tdap) or tetanus and diphtheria (Td) immunization. It is very important that you and your family (including grandparents) or others caring for your  are up-to-date with the Tdap or Td immunizations. The Tdap or Td immunization can help protect your  from getting ill.    Rubella immunization.    Varicella (chickenpox) immunization.    Influenza immunization. You should receive this annual immunization if you did not receive the immunization during your pregnancy.     This information is not intended to replace advice given to you by your health care provider. Make sure you discuss any questions you have with your health care provider.     Document Released: 10/14/2008 Document Revised: 2013 Document Reviewed: 2013  [employer] Interactive Patient Education ©6 [employer]          Postpartum Depression and Baby Blues  The postpartum period begins right after the birth of a baby. During this time, there is often a great amount of ifrah and excitement. It is also a time of many changes in the life of the parents. Regardless of how many times a mother gives birth, each child brings new challenges and dynamics to the family. It is not unusual to have feelings of excitement along with confusing shifts in moods, emotions, and thoughts. All mothers are at risk of developing postpartum depression or  "the \"baby blues.\" These mood changes can occur right after giving birth, or they may occur many months after giving birth. The baby blues or postpartum depression can be mild or severe. Additionally, postpartum depression can go away rather quickly, or it can be a long-term condition.   CAUSES  Raised hormone levels and the rapid drop in those levels are thought to be a main cause of postpartum depression and the baby blues. A number of hormones change during and after pregnancy. Estrogen and progesterone usually decrease right after the delivery of your baby. The levels of thyroid hormone and various cortisol steroids also rapidly drop. Other factors that play a role in these mood changes include major life events and genetics.   RISK FACTORS  If you have any of the following risks for the baby blues or postpartum depression, know what symptoms to watch out for during the postpartum period. Risk factors that may increase the likelihood of getting the baby blues or postpartum depression include:  · Having a personal or family history of depression.    · Having depression while being pregnant.    · Having premenstrual mood issues or mood issues related to oral contraceptives.  · Having a lot of life stress.    · Having marital conflict.    · Lacking a social support network.    · Having a baby with special needs.    · Having health problems, such as diabetes.    SIGNS AND SYMPTOMS  Symptoms of baby blues include:  · Brief changes in mood, such as going from extreme happiness to sadness.  · Decreased concentration.    · Difficulty sleeping.    · Crying spells, tearfulness.    · Irritability.    · Anxiety.    Symptoms of postpartum depression typically begin within the first month after giving birth. These symptoms include:  · Difficulty sleeping or excessive sleepiness.    · Marked weight loss.    · Agitation.    · Feelings of worthlessness.    · Lack of interest in activity or food.    Postpartum psychosis is a very " serious condition and can be dangerous. Fortunately, it is rare. Displaying any of the following symptoms is cause for immediate medical attention. Symptoms of postpartum psychosis include:   · Hallucinations and delusions.    · Bizarre or disorganized behavior.    · Confusion or disorientation.    DIAGNOSIS   A diagnosis is made by an evaluation of your symptoms. There are no medical or lab tests that lead to a diagnosis, but there are various questionnaires that a health care provider may use to identify those with the baby blues, postpartum depression, or psychosis. Often, a screening tool called the [place]  Depression Scale is used to diagnose depression in the postpartum period.   TREATMENT  The baby blues usually goes away on its own in 1-2 weeks. Social support is often all that is needed. You will be encouraged to get adequate sleep and rest. Occasionally, you may be given medicines to help you sleep.   Postpartum depression requires treatment because it can last several months or longer if it is not treated. Treatment may include individual or group therapy, medicine, or both to address any social, physiological, and psychological factors that may play a role in the depression. Regular exercise, a healthy diet, rest, and social support may also be strongly recommended.   Postpartum psychosis is more serious and needs treatment right away. Hospitalization is often needed.  HOME CARE INSTRUCTIONS  · Get as much rest as you can. Nap when the baby sleeps.    · Exercise regularly. Some women find yoga and walking to be beneficial.    · Eat a balanced and nourishing diet.    · Do little things that you enjoy. Have a cup of tea, take a bubble bath, read your favorite magazine, or listen to your favorite music.  · Avoid alcohol.    · Ask for help with household chores, cooking, grocery shopping, or running errands as needed. Do not try to do everything.    · Talk to people close to you about how you are  feeling. Get support from your partner, family members, friends, or other new moms.  · Try to stay positive in how you think. Think about the things you are grateful for.    · Do not spend a lot of time alone.    · Only take over-the-counter or prescription medicine as directed by your health care provider.  · Keep all your postpartum appointments.    · Let your health care provider know if you have any concerns.    SEEK MEDICAL CARE IF:  You are having a reaction to or problems with your medicine.  SEEK IMMEDIATE MEDICAL CARE IF:  · You have suicidal feelings.    · You think you may harm the baby or someone else.  MAKE SURE YOU:  · Understand these instructions.  · Will watch your condition.  · Will get help right away if you are not doing well or get worse.     This information is not intended to replace advice given to you by your health care provider. Make sure you discuss any questions you have with your health care provider.     Document Released: 09/21/2005 Document Revised: 12/23/2014 Document Reviewed: 09/29/2014  [employer] Patient Education ©2016 [employer]          Ibuprofen tablets and capsules  What is this medicine?  IBUPROFEN (eye BYOO proe fen) is a non-steroidal anti-inflammatory drug (NSAID). It is used for dental pain, fever, headaches or migraines, osteoarthritis, rheumatoid arthritis, or painful monthly periods. It can also relieve minor aches and pains caused by a cold, flu, or sore throat.  This medicine may be used for other purposes; ask your health care provider or pharmacist if you have questions.  What should I tell my health care provider before I take this medicine?  They need to know if you have any of these conditions:  -asthma  -cigarette smoker  -drink more than 3 alcohol containing drinks a day  -heart disease or circulation problems such as heart failure or leg edema (fluid retention)  -high blood pressure  -kidney disease  -liver disease  -stomach bleeding or  ulcers  -an unusual or allergic reaction to ibuprofen, aspirin, other NSAIDS, other medicines, foods, dyes, or preservatives  -pregnant or trying to get pregnant  -breast-feeding  How should I use this medicine?  Take this medicine by mouth with a glass of water. Follow the directions on the prescription label. Take this medicine with food if your stomach gets upset. Try to not lie down for at least 10 minutes after you take the medicine. Take your medicine at regular intervals. Do not take your medicine more often than directed.  A special MedGuide will be given to you by the pharmacist with each prescription and refill. Be sure to read this information carefully each time.  Talk to your pediatrician regarding the use of this medicine in children. Special care may be needed.  Overdosage: If you think you have taken too much of this medicine contact a poison control center or emergency room at once.  NOTE: This medicine is only for you. Do not share this medicine with others.  What if I miss a dose?  If you miss a dose, take it as soon as you can. If it is almost time for your next dose, take only that dose. Do not take double or extra doses.  What may interact with this medicine?  Do not take this medicine with any of the following medications:  -cidofovir  -ketorolac  -methotrexate  -pemetrexed  This medicine may also interact with the following medications:  -alcohol  -aspirin  -diuretics  -lithium  -other drugs for inflammation like prednisone  -warfarin  This list may not describe all possible interactions. Give your health care provider a list of all the medicines, herbs, non-prescription drugs, or dietary supplements you use. Also tell them if you smoke, drink alcohol, or use illegal drugs. Some items may interact with your medicine.  What should I watch for while using this medicine?  Tell your doctor or healthcare professional if your symptoms do not start to get better or if they get worse.  This medicine  does not prevent heart attack or stroke. In fact, this medicine may increase the chance of a heart attack or stroke. The chance may increase with longer use of this medicine and in people who have heart disease. If you take aspirin to prevent heart attack or stroke, talk with your doctor or health care professional.  Do not take other medicines that contain aspirin, ibuprofen, or naproxen with this medicine. Side effects such as stomach upset, nausea, or ulcers may be more likely to occur. Many medicines available without a prescription should not be taken with this medicine.  This medicine can cause ulcers and bleeding in the stomach and intestines at any time during treatment. Ulcers and bleeding can happen without warning symptoms and can cause death. To reduce your risk, do not smoke cigarettes or drink alcohol while you are taking this medicine.  You may get drowsy or dizzy. Do not drive, use machinery, or do anything that needs mental alertness until you know how this medicine affects you. Do not stand or sit up quickly, especially if you are an older patient. This reduces the risk of dizzy or fainting spells.  This medicine can cause you to bleed more easily. Try to avoid damage to your teeth and gums when you brush or floss your teeth.  This medicine may be used to treat migraines. If you take migraine medicines for 10 or more days a month, your migraines may get worse. Keep a diary of headache days and medicine use. Contact your healthcare professional if your migraine attacks occur more frequently.  What side effects may I notice from receiving this medicine?  Side effects that you should report to your doctor or health care professional as soon as possible:  -allergic reactions like skin rash, itching or hives, swelling of the face, lips, or tongue  -severe stomach pain  -signs and symptoms of bleeding such as bloody or black, tarry stools; red or dark-brown urine; spitting up blood or brown material that  looks like coffee grounds; red spots on the skin; unusual bruising or bleeding from the eye, gums, or nose  -signs and symptoms of a blood clot such as changes in vision; chest pain; severe, sudden headache; trouble speaking; sudden numbness or weakness of the face, arm, or leg  -unexplained weight gain or swelling  -unusually weak or tired  -yellowing of eyes or skin  Side effects that usually do not require medical attention (report to your doctor or health care professional if they continue or are bothersome):  -bruising  -diarrhea  -dizziness, drowsiness  -headache  -nausea, vomiting  This list may not describe all possible side effects. Call your doctor for medical advice about side effects. You may report side effects to FDA at 4-455-FDA-0104.  Where should I keep my medicine?  Keep out of the reach of children.  Store at room temperature between 15 and 30 degrees C (59 and 86 degrees F). Keep container tightly closed. Throw away any unused medicine after the expiration date.  NOTE: This sheet is a summary. It may not cover all possible information. If you have questions about this medicine, talk to your doctor, pharmacist, or health care provider.     © 2016, Elsevier/Gold Standard. (2014-08-19 10:48:02)          Acetaminophen; Hydrocodone tablets or capsules  What is this medicine?  ACETAMINOPHEN; HYDROCODONE (a set a TATUM duy fen; benita droe KOE done) is a pain reliever. It is used to treat moderate to severe pain.  This medicine may be used for other purposes; ask your health care provider or pharmacist if you have questions.  What should I tell my health care provider before I take this medicine?  They need to know if you have any of these conditions:  -brain tumor  -Crohn's disease, inflammatory bowel disease, or ulcerative colitis  -drug abuse or addiction  -head injury  -heart or circulation problems  -if you often drink alcohol  -kidney disease or problems going to the bathroom  -liver disease  -lung  disease, asthma, or breathing problems  -an unusual or allergic reaction to acetaminophen, hydrocodone, other opioid analgesics, other medicines, foods, dyes, or preservatives  -pregnant or trying to get pregnant  -breast-feeding  How should I use this medicine?  Take this medicine by mouth. Swallow it with a full glass of water. Follow the directions on the prescription label. If the medicine upsets your stomach, take the medicine with food or milk. Do not take more than you are told to take.  Talk to your pediatrician regarding the use of this medicine in children. This medicine is not approved for use in children.  Patients over 65 years may have a stronger reaction and need a smaller dose.  Overdosage: If you think you have taken too much of this medicine contact a poison control center or emergency room at once.  NOTE: This medicine is only for you. Do not share this medicine with others.  What if I miss a dose?  If you miss a dose, take it as soon as you can. If it is almost time for your next dose, take only that dose. Do not take double or extra doses.  What may interact with this medicine?  -alcohol  -antihistamines  -isoniazid  -medicines for depression, anxiety, or psychotic disturbances  -medicines for sleep  -muscle relaxants  -naltrexone  -narcotic medicines (opiates) for pain  -phenobarbital  -ritonavir  -tramadol  This list may not describe all possible interactions. Give your health care provider a list of all the medicines, herbs, non-prescription drugs, or dietary supplements you use. Also tell them if you smoke, drink alcohol, or use illegal drugs. Some items may interact with your medicine.  What should I watch for while using this medicine?  Tell your doctor or health care professional if your pain does not go away, if it gets worse, or if you have new or a different type of pain. You may develop tolerance to the medicine. Tolerance means that you will need a higher dose of the medicine for  pain relief. Tolerance is normal and is expected if you take the medicine for a long time.  Do not suddenly stop taking your medicine because you may develop a severe reaction. Your body becomes used to the medicine. This does NOT mean you are addicted. Addiction is a behavior related to getting and using a drug for a non-medical reason. If you have pain, you have a medical reason to take pain medicine. Your doctor will tell you how much medicine to take. If your doctor wants you to stop the medicine, the dose will be slowly lowered over time to avoid any side effects.  You may get drowsy or dizzy when you first start taking the medicine or change doses. Do not drive, use machinery, or do anything that may be dangerous until you know how the medicine affects you. Stand or sit up slowly.  There are different types of narcotic medicines (opiates) for pain. If you take more than one type at the same time, you may have more side effects. Give your health care provider a list of all medicines you use. Your doctor will tell you how much medicine to take. Do not take more medicine than directed. Call emergency for help if you have problems breathing.  The medicine will cause constipation. Try to have a bowel movement at least every 2 to 3 days. If you do not have a bowel movement for 3 days, call your doctor or health care professional.  Too much acetaminophen can be very dangerous. Do not take Tylenol (acetaminophen) or medicines that contain acetaminophen with this medicine. Many non-prescription medicines contain acetaminophen. Always read the labels carefully.  What side effects may I notice from receiving this medicine?  Side effects that you should report to your doctor or health care professional as soon as possible:  -allergic reactions like skin rash, itching or hives, swelling of the face, lips, or tongue  -breathing problems  -confusion  -feeling faint or lightheaded, falls  -stomach pain  -yellowing of the eyes  or skin  Side effects that usually do not require medical attention (report to your doctor or health care professional if they continue or are bothersome):  -nausea, vomiting  -stomach upset  This list may not describe all possible side effects. Call your doctor for medical advice about side effects. You may report side effects to FDA at 7-288-FDA-3556.  Where should I keep my medicine?  Keep out of the reach of children. This medicine can be abused. Keep your medicine in a safe place to protect it from theft. Do not share this medicine with anyone. Selling or giving away this medicine is dangerous and against the law.  This medicine may cause accidental overdose and death if it taken by other adults, children, or pets. Mix any unused medicine with a substance like cat litter or coffee grounds. Then throw the medicine away in a sealed container like a sealed bag or a coffee can with a lid. Do not use the medicine after the expiration date.  Store at room temperature between 15 and 30 degrees C (59 and 86 degrees F).  NOTE: This sheet is a summary. It may not cover all possible information. If you have questions about this medicine, talk to your doctor, pharmacist, or health care provider.     © 2016, Elsevier/Gold Standard. (2015-11-18 15:29:20)            SYMPTOMS OF A STROKE    Call 911 or have someone take you to the Emergency Department if you have any of the following:    · Sudden numbness or weakness of your face, arm or leg especially on one side of the body  · Sudden confusion, diffiiculty speaking or trouble understanding   · Changes in your vision or loss of sight in one eye  · Sudden severe headache with no known cause  · sudden dizziness, trouble walking, loss of balance or coordination    It is important to seek emergency care right away if you have further stroke symptoms. If you get emergency help quickly, the powerful clot-dissolving medicines can reduce the disabilities caused by a stroke.     For  more information:    American Stroke Association  4-269-6-STROKE  www.strokeassociation.org           IF YOU SMOKE OR USE TOBACCO PLEASE READ THE FOLLOWING:    Why is smoking bad for me?  Smoking increases the risk of heart disease, lung disease, vascular disease, stroke, and cancer.     If you smoke, STOP!    If you would like more information on quitting smoking, please visit the [employer] website: [email or web address]   This link will provide additional resources including the QUIT line and the [employer] support groups.     For more information:    American Cancer Society  ([phone] 156-4560    American Heart Association  1-370.328.6731               YOU ARE THE MOST IMPORTANT FACTOR IN YOUR RECOVERY.     Follow all instructions carefully.     I have reviewed my discharge instructions with my nurse, including the following information, if applicable:     Information about my illness and diagnosis   Follow up appointments (including lab draws)   Wound Care   Equipment Needs   Medications (new and continuing) along with side effects   Preventative information such as vaccines and smoking cessations   Diet   Pain   I know when to contact my Doctor's office or seek emergency care      I want my nurse to describe the side effects of my medications: YES NO   If the answer is no, I understand the side effects of my medications: YES NO   My nurse described the side effects of my medications in a way that I could understand: YES NO   I have taken my personal belongings and my own medications with me at discharge: YES NO            I have received this information and my questions have been answered. I have discussed any concerns I see with this plan with the nurse or physician. I understand these instructions.    Signature of Patient or Responsible Person: _____________________________________    Date: _________________  Time: __________________    Signature of Healthcare  Provider: _______________________________________  Date: _________________  Time: __________________

## 2017-01-18 NOTE — OP NOTE
DELIVERY REPORT    [facility]  Patient: Sania Monroe  : 1990  Age: 26 y.o.  Unit Number: [id number]    DATE OF DELIVERY: 2017    PREOPERATIVE DIAGNOSIS: spontaneous labor    POSTOPERATIVE DIAGNOSIS:  same as pre-op diagnosis    PROCEDURE PERFORMED:   Spontaneous Vaginal Delivery    SURGEON: Yohannes Dominguez MD    ASSISTANT:   None    ANESTHESIA:   Epidural    ESTIMATED BLOOD LOSS:   100 cc    FINDINGS:    Live viable male infant.  Apgars 9 at one and five minutes.  Weight 8lbs.             Normal placenta and cord                        1st Degree vaginal laceration with right periurethral tear.    DESCRIPTION OF PROCEDURE: Patient is a 26 year old female admitted in active labor at term.  Prenatal care was uncomplicated.  Patient rapidly progressed thru the active phase of labor with regional anesthesia.  Patient had spontaneous rupture of membranes in late active phase of labor.  Fetal status was reassuring overall.  Patient pushed for 10 minutes and delivered a live viable male infant in the occiput anterior position.  Nose and mouth were bulb suctioned upon presentation of the head.  Gentle downward traction to deliver shoulders and body.  Cord was clamped and cut and infant was attended by  staff.  Cord blood was obtained and placenta was delivered spontaneously intact with 3 vessel cord noted.  A first degree vaginal laceration was repaired with 3-0 Monocryl without difficulty, as was a small periurethral tear.    COMPLICATIONS: None  SPECIMEN:  Cord blood  DISPOSITION:  Mother and baby remained in LDR in stable condition       Yohannes Dominguez MD  Completed: 2017  11:38 AM

## 2017-01-18 NOTE — ANESTHESIA PROCEDURE NOTES
Labor Epidural    Patient location during procedure: OB  Indication:at surgeon's request  Performed By  Anesthesiologist: KRYSTINA HAIDER  Preanesthetic Checklist  Completed: patient identified, site marked, surgical consent, pre-op evaluation, timeout performed, IV checked, risks and benefits discussed and monitors and equipment checked  Additional Notes  Connected epidural catheter to PCEA and detailed instructions given to patient for usage of PCEA pump.  Epidural Block Prep:  Pt Position:sitting  Sterile Tech:cap, gloves, mask and sterile barrier  Monitoring:blood pressure monitoring and EKG  Epidural Block Procedure:  Approach:midline  Location:L3-L4  Needle Type:Tuohy  Needle Gauge:17 G  Loss of Resistance: 7cm  Cath Depth at skin:12 cm  Paresthesia: none  Aspiration:negative  Test Dose:negative  Post Assessment:  Dressing:occlusive dressing applied and secured with tape  Pt Tolerance:patient tolerated the procedure well with no apparent complications  Complications:no

## 2017-01-18 NOTE — PLAN OF CARE
Problem: Patient Care Overview (Adult)  Goal: Adult Individualization and Mutuality    01/18/17 0435   Individualization   Patient Specific Preferences good pain control, skin to skin after delivery   Patient Specific Goals patient will communicate with nusing staff when reay for pain medication or intervention.

## 2017-01-18 NOTE — ANESTHESIA PREPROCEDURE EVALUATION
Anesthesia Evaluation      No history of anesthetic complications   Airway   no difficulty expected  Dental - normal exam     Pulmonary - negative pulmonary ROS and normal exam   (-) COPD, asthma, sleep apnea, not a smoker    PE comment: nonlabored  Cardiovascular - negative cardio ROS and normal exam  (-) hypertension, valvular problems/murmurs, past MI, CAD, dysrhythmias, angina    Rhythm: regular  Rate: normal    Neuro/Psych  (+) headaches,    (-) seizures, TIA, CVA  GI/Hepatic/Renal/Endo - negative ROS   (-) GERD, liver disease, renal disease, diabetes, hypothyroidism, hyperthyroidism    Musculoskeletal (-) negative ROS    Abdominal    Substance History      OB/GYN    (+) Pregnant,         Other                             Anesthesia Plan    ASA 2     epidural     Anesthetic plan and risks discussed with patient.

## 2017-01-18 NOTE — PLAN OF CARE
Problem: Patient Care Overview (Adult)  Goal: Plan of Care Review  Outcome: Ongoing (interventions implemented as appropriate)    01/18/17 0658   Coping/Psychosocial Response Interventions   Plan Of Care Reviewed With patient;significant other   Patient Care Overview   Progress progress toward functional goals as expected       Goal: Adult Individualization and Mutuality  Outcome: Ongoing (interventions implemented as appropriate)    01/18/17 0435   Individualization   Patient Specific Preferences good pain control, skin to skin after delivery   Patient Specific Goals patient will communicate with nusing staff when reay for pain medication or intervention.       Goal: Discharge Needs Assessment  Outcome: Ongoing (interventions implemented as appropriate)    01/18/17 0658   Discharge Needs Assessment   Concerns To Be Addressed no discharge needs identified   Readmission Within The Last 30 Days no previous admission in last 30 days   Equipment Needed After Discharge none   Discharge Disposition home or self-care   Current Health   Anticipated Changes Related to Illness none   Self-Care   Equipment Currently Used at Home none   Living Environment   Transportation Available car         Problem: Labor (Cervical Ripen, Induct, Augment) (Adult,Obstetrics,Pediatric)  Goal: Signs and Symptoms of Listed Potential Problems Will be Absent or Manageable (Labor)  Outcome: Ongoing (interventions implemented as appropriate)    01/18/17 0687   Labor (Cervical Ripen, Induct, Augment)   Problems Assessed (Labor) all   Problems Present (Labor) none

## 2017-01-18 NOTE — PLAN OF CARE
Problem: Patient Care Overview (Adult)  Goal: Plan of Care Review  Outcome: Ongoing (interventions implemented as appropriate)  Goal: Adult Individualization and Mutuality  Outcome: Ongoing (interventions implemented as appropriate)  Goal: Discharge Needs Assessment  Outcome: Ongoing (interventions implemented as appropriate)    Problem: Labor (Cervical Ripen, Induct, Augment) (Adult,Obstetrics,Pediatric)  Goal: Signs and Symptoms of Listed Potential Problems Will be Absent or Manageable (Labor)  Outcome: Outcome(s) achieved Date Met:  01/18/17  No complications during delivery. Pt tolerated well.    Problem: Postpartum, Vaginal Delivery (Adult)  Goal: Signs and Symptoms of Listed Potential Problems Will be Absent or Manageable (Postpartum, Vaginal Delivery)  Outcome: Ongoing (interventions implemented as appropriate)  No complications present in recovery so far. Pt in no discomfort and is bonding well with baby.

## 2017-01-19 LAB
BASOPHILS # BLD AUTO: 0.02 10*3/MM3 (ref 0–0.2)
BASOPHILS NFR BLD AUTO: 0.1 % (ref 0–1.5)
DEPRECATED RDW RBC AUTO: 46.2 FL (ref 37–54)
EOSINOPHIL # BLD AUTO: 0.24 10*3/MM3 (ref 0–0.7)
EOSINOPHIL NFR BLD AUTO: 1.3 % (ref 0.3–6.2)
ERYTHROCYTE [DISTWIDTH] IN BLOOD BY AUTOMATED COUNT: 12.8 % (ref 11.7–13)
HCT VFR BLD AUTO: 35.5 % (ref 35.6–45.5)
HGB BLD-MCNC: 12.1 G/DL (ref 11.9–15.5)
IMM GRANULOCYTES # BLD: 0.14 10*3/MM3 (ref 0–0.03)
IMM GRANULOCYTES NFR BLD: 0.7 % (ref 0–0.5)
LYMPHOCYTES # BLD AUTO: 2.79 10*3/MM3 (ref 0.9–4.8)
LYMPHOCYTES NFR BLD AUTO: 14.8 % (ref 19.6–45.3)
MCH RBC QN AUTO: 33.8 PG (ref 26.9–32)
MCHC RBC AUTO-ENTMCNC: 34.1 G/DL (ref 32.4–36.3)
MCV RBC AUTO: 99.2 FL (ref 80.5–98.2)
MONOCYTES # BLD AUTO: 1.48 10*3/MM3 (ref 0.2–1.2)
MONOCYTES NFR BLD AUTO: 7.8 % (ref 5–12)
NEUTROPHILS # BLD AUTO: 14.22 10*3/MM3 (ref 1.9–8.1)
NEUTROPHILS NFR BLD AUTO: 75.3 % (ref 42.7–76)
PLATELET # BLD AUTO: 258 10*3/MM3 (ref 140–500)
PMV BLD AUTO: 9.9 FL (ref 6–12)
RBC # BLD AUTO: 3.58 10*6/MM3 (ref 3.9–5.2)
WBC NRBC COR # BLD: 18.89 10*3/MM3 (ref 4.5–10.7)

## 2017-01-19 PROCEDURE — 85025 COMPLETE CBC W/AUTO DIFF WBC: CPT | Performed by: OBSTETRICS & GYNECOLOGY

## 2017-01-19 PROCEDURE — 99232 SBSQ HOSP IP/OBS MODERATE 35: CPT | Performed by: OBSTETRICS & GYNECOLOGY

## 2017-01-19 RX ADMIN — HYDROCODONE BITARTRATE AND ACETAMINOPHEN 1 TABLET: 5; 325 TABLET ORAL at 05:55

## 2017-01-19 RX ADMIN — HYDROCODONE BITARTRATE AND ACETAMINOPHEN 1 TABLET: 5; 325 TABLET ORAL at 10:33

## 2017-01-19 RX ADMIN — IBUPROFEN 600 MG: 600 TABLET ORAL at 15:07

## 2017-01-19 RX ADMIN — DOCUSATE SODIUM 100 MG: 100 CAPSULE, LIQUID FILLED ORAL at 10:33

## 2017-01-19 RX ADMIN — HYDROCODONE BITARTRATE AND ACETAMINOPHEN 1 TABLET: 5; 325 TABLET ORAL at 15:07

## 2017-01-19 RX ADMIN — HYDROCODONE BITARTRATE AND ACETAMINOPHEN 1 TABLET: 5; 325 TABLET ORAL at 01:50

## 2017-01-19 RX ADMIN — HYDROCODONE BITARTRATE AND ACETAMINOPHEN 1 TABLET: 5; 325 TABLET ORAL at 20:35

## 2017-01-19 RX ADMIN — Medication 1 TABLET: at 10:33

## 2017-01-19 RX ADMIN — DOCUSATE SODIUM 100 MG: 100 CAPSULE, LIQUID FILLED ORAL at 19:43

## 2017-01-19 RX ADMIN — IBUPROFEN 600 MG: 600 TABLET ORAL at 05:55

## 2017-01-19 NOTE — PLAN OF CARE
Problem: Patient Care Overview (Adult)  Goal: Plan of Care Review  Outcome: Ongoing (interventions implemented as appropriate)    17 06   Coping/Psychosocial Response Interventions   Plan Of Care Reviewed With patient   Patient Care Overview   Progress improving   Outcome Evaluation   Outcome Summary/Follow up Plan pt doing well pain controlled on po meds, 2 bld clots over night, up and ambulating, pt showered, bottle feeding mostly, no complaints        Goal: Adult Individualization and Mutuality  Outcome: Ongoing (interventions implemented as appropriate)  Goal: Discharge Needs Assessment  Outcome: Ongoing (interventions implemented as appropriate)    17 06   Discharge Needs Assessment   Concerns To Be Addressed no discharge needs identified   Readmission Within The Last 30 Days no previous admission in last 30 days   Equipment Needed After Discharge none   Discharge Disposition home or self-care   Current Health   Anticipated Changes Related to Illness none   Living Environment   Transportation Available none         Problem: Postpartum, Vaginal Delivery (Adult)  Goal: Signs and Symptoms of Listed Potential Problems Will be Absent or Manageable (Postpartum, Vaginal Delivery)  Outcome: Ongoing (interventions implemented as appropriate)    17 06   Postpartum, Vaginal Delivery   Problems Assessed (Postpartum Vaginal Delivery) all   Problems Present (Postpartum Vaginal Delivery) none         Problem: Breastfeeding (Adult,NICU,,Obstetrics,Pediatric)  Goal: Signs and Symptoms of Listed Potential Problems Will be Absent or Manageable (Breastfeeding)  Outcome: Ongoing (interventions implemented as appropriate)    17 06   Breastfeeding   Problems Assessed (Breastfeeding) all   Problems Present (Breastfeeding) none

## 2017-01-19 NOTE — PROGRESS NOTES
Postpartum Progress Note      Status post Vaginal Deliver: Doing well postoperatively.     1) Mild leukocytosis post delivery, but unchanged from yesterday when she showed up in labor? Recheck in AM, but no other signs of active endometritis (fever, uterine tenderness, maternal tachycardia).    Rh status: O positive  Rubella: immune  Gender: Male    Circumcision reviewed.   R/B/A discussed  Voiced understanding and wishes to proceed      Subjective     Postpartum Day 1: Vaginal delivery    The patient feels well. The patient denies emotional concerns. Pain is well controlled with current medications. The baby is well. The patient is ambulating well. The patient is tolerating a normal diet.     Objective     Vital signs in last 24 hours:  Temp:  [97.5 °F (36.4 °C)-99.1 °F (37.3 °C)] 97.5 °F (36.4 °C)  Heart Rate:  [65-81] 65  Resp:  [16-18] 16  BP: (112-133)/(55-77) 112/75      General:    alert, appears stated age and cooperative   Abdomen:  Soft, Non-tender    Lochia:  appropriate   Uterine Fundus:   firm   Ext    Edema 1+   DVT Evaluation:  No evidence of DVT seen on physical exam.     Lab Results   Component Value Date    WBC 18.89 (H) 01/19/2017    HGB 12.1 01/19/2017    HCT 35.5 (L) 01/19/2017    MCV 99.2 (H) 01/19/2017     01/19/2017       Lenny Patel MD  1/19/2017  10:44 AM

## 2017-01-20 VITALS
WEIGHT: 195.2 LBS | TEMPERATURE: 98.1 F | BODY MASS INDEX: 29.58 KG/M2 | DIASTOLIC BLOOD PRESSURE: 72 MMHG | HEIGHT: 68 IN | HEART RATE: 66 BPM | RESPIRATION RATE: 18 BRPM | SYSTOLIC BLOOD PRESSURE: 107 MMHG | OXYGEN SATURATION: 99 %

## 2017-01-20 LAB
BASOPHILS # BLD AUTO: 0.02 10*3/MM3 (ref 0–0.2)
BASOPHILS NFR BLD AUTO: 0.1 % (ref 0–1.5)
DEPRECATED RDW RBC AUTO: 48.6 FL (ref 37–54)
EOSINOPHIL # BLD AUTO: 0.22 10*3/MM3 (ref 0–0.7)
EOSINOPHIL NFR BLD AUTO: 1.6 % (ref 0.3–6.2)
ERYTHROCYTE [DISTWIDTH] IN BLOOD BY AUTOMATED COUNT: 13 % (ref 11.7–13)
HCT VFR BLD AUTO: 34.7 % (ref 35.6–45.5)
HGB BLD-MCNC: 11.1 G/DL (ref 11.9–15.5)
IMM GRANULOCYTES # BLD: 0.11 10*3/MM3 (ref 0–0.03)
IMM GRANULOCYTES NFR BLD: 0.8 % (ref 0–0.5)
LYMPHOCYTES # BLD AUTO: 1.71 10*3/MM3 (ref 0.9–4.8)
LYMPHOCYTES NFR BLD AUTO: 12.6 % (ref 19.6–45.3)
MCH RBC QN AUTO: 32.6 PG (ref 26.9–32)
MCHC RBC AUTO-ENTMCNC: 32 G/DL (ref 32.4–36.3)
MCV RBC AUTO: 102.1 FL (ref 80.5–98.2)
MONOCYTES # BLD AUTO: 0.96 10*3/MM3 (ref 0.2–1.2)
MONOCYTES NFR BLD AUTO: 7.1 % (ref 5–12)
NEUTROPHILS # BLD AUTO: 10.59 10*3/MM3 (ref 1.9–8.1)
NEUTROPHILS NFR BLD AUTO: 77.8 % (ref 42.7–76)
PLATELET # BLD AUTO: 281 10*3/MM3 (ref 140–500)
PMV BLD AUTO: 9.4 FL (ref 6–12)
RBC # BLD AUTO: 3.4 10*6/MM3 (ref 3.9–5.2)
WBC NRBC COR # BLD: 13.61 10*3/MM3 (ref 4.5–10.7)

## 2017-01-20 PROCEDURE — 85025 COMPLETE CBC W/AUTO DIFF WBC: CPT | Performed by: OBSTETRICS & GYNECOLOGY

## 2017-01-20 PROCEDURE — 99238 HOSP IP/OBS DSCHRG MGMT 30/<: CPT | Performed by: OBSTETRICS & GYNECOLOGY

## 2017-01-20 RX ORDER — MISOPROSTOL 200 UG/1
800 TABLET ORAL AS NEEDED
Status: DISCONTINUED | OUTPATIENT
Start: 2017-01-20 | End: 2017-01-20 | Stop reason: HOSPADM

## 2017-01-20 RX ORDER — CARBOPROST TROMETHAMINE 250 UG/ML
250 INJECTION, SOLUTION INTRAMUSCULAR AS NEEDED
Status: DISCONTINUED | OUTPATIENT
Start: 2017-01-20 | End: 2017-01-20 | Stop reason: HOSPADM

## 2017-01-20 RX ORDER — HYDROCODONE BITARTRATE AND ACETAMINOPHEN 5; 325 MG/1; MG/1
1 TABLET ORAL EVERY 4 HOURS PRN
Qty: 10 TABLET | Refills: 0 | Status: SHIPPED | OUTPATIENT
Start: 2017-01-20 | End: 2017-01-28

## 2017-01-20 RX ORDER — METHYLERGONOVINE MALEATE 0.2 MG/ML
200 INJECTION INTRAVENOUS ONCE AS NEEDED
Status: DISCONTINUED | OUTPATIENT
Start: 2017-01-20 | End: 2017-01-20 | Stop reason: HOSPADM

## 2017-01-20 RX ADMIN — DOCUSATE SODIUM 100 MG: 100 CAPSULE, LIQUID FILLED ORAL at 10:09

## 2017-01-20 RX ADMIN — HYDROCODONE BITARTRATE AND ACETAMINOPHEN 1 TABLET: 5; 325 TABLET ORAL at 10:03

## 2017-01-20 RX ADMIN — HYDROCODONE BITARTRATE AND ACETAMINOPHEN 1 TABLET: 5; 325 TABLET ORAL at 05:50

## 2017-01-20 RX ADMIN — HYDROCODONE BITARTRATE AND ACETAMINOPHEN 1 TABLET: 5; 325 TABLET ORAL at 01:37

## 2017-01-20 RX ADMIN — IBUPROFEN 600 MG: 600 TABLET ORAL at 01:37

## 2017-01-20 RX ADMIN — IBUPROFEN 600 MG: 600 TABLET ORAL at 10:04

## 2017-01-20 NOTE — PROGRESS NOTES
Postpartum Progress Note      Status post Vaginal Deliver: Doing well postoperatively. Leukocytosis improving on morning labs.  No si/sx of infection.    Discharge home today.  Discharge instructions reviewed with patient.    Rh status: O positive  Rubella: immune  Gender: Male      Subjective     Postpartum Day 2: Vaginal delivery    The patient feels well. The patient denies emotional concerns. Pain is well controlled with current medications. The baby is well. The patient is ambulating well. The patient is tolerating a normal diet.     Objective     Vital signs in last 24 hours:  Temp:  [98.1 °F (36.7 °C)-99 °F (37.2 °C)] 98.1 °F (36.7 °C)  Heart Rate:  [66-89] 66  Resp:  [16-18] 18  BP: (107-134)/(68-76) 107/72      General:    alert, appears stated age and cooperative   Abdomen:  Soft, Non-tender    Lochia:  appropriate   Uterine Fundus:   firm   Ext    Edema 1+   DVT Evaluation:  No evidence of DVT seen on physical exam.     Lab Results   Component Value Date    WBC 13.61 (H) 01/20/2017    HGB 11.1 (L) 01/20/2017    HCT 34.7 (L) 01/20/2017    .1 (H) 01/20/2017     01/20/2017       Magnolia Ch MD  1/20/2017  10:09 AM

## 2017-01-20 NOTE — LACTATION NOTE
Pt declines assistance. States she has tried the manual pump a couple times and has tried to latch infant a few times. Pt feels as though infant does not like breast. Discussed nipple difference and encouraged pt to call [facility] if desires assistance later. [facility] card given and encouraged follow up as needed.

## 2017-03-13 ENCOUNTER — POSTPARTUM VISIT (OUTPATIENT)
Dept: OBSTETRICS AND GYNECOLOGY | Facility: CLINIC | Age: 27
End: 2017-03-13

## 2017-03-13 VITALS
HEART RATE: 66 BPM | WEIGHT: 172 LBS | DIASTOLIC BLOOD PRESSURE: 67 MMHG | SYSTOLIC BLOOD PRESSURE: 106 MMHG | HEIGHT: 68 IN | BODY MASS INDEX: 26.07 KG/M2

## 2017-03-13 DIAGNOSIS — Z30.09 BIRTH CONTROL COUNSELING: ICD-10-CM

## 2017-03-13 PROBLEM — Z34.90 PREGNANCY: Status: RESOLVED | Noted: 2017-01-17 | Resolved: 2017-03-13

## 2017-03-13 PROCEDURE — 99213 OFFICE O/P EST LOW 20 MIN: CPT | Performed by: OBSTETRICS & GYNECOLOGY

## 2017-03-13 NOTE — PROGRESS NOTES
"Subjective   Sania Mago Monroe is a 26 y.o. female   Chief complaint: Postpartum visit, discuss birth control    History of Present Illness  Patient is here about 8 weeks status post an incompetent vaginal delivery.  She is without major complaints at this time.  She had bleeding for about 4 weeks after delivery, but that has stopped.  She does not believe she has had a menses.  She is breast and bottle feeding.  She has had sex 1 time since delivery, which was yesterday.  She reports they did use a condom.  She is otherwise doing well.  The patient would like to start on birth control.  She is uncertain exactly which type of birth control she wants to start.  She has used Depo-Provera in the past, but was unhappy with the side effects of mood changes and acne.  She does not think she will remember to take a pill daily.  She is interested in long-acting reversible contraception.  She does not think that she would tolerate an IUD.    The following portions of the patient's history were reviewed and updated as appropriate: allergies, current medications, past family history, past medical history, past social history, past surgical history and problem list.    Review of Systems  General: No fever or chills  Constitutional: No weight loss or gain, no hair loss  HENT: No headache, no hearing loss, no tinnitus  Eyes: normal vision, no eye pain  Lungs: No cough, no shortness of breath  Heart: No chest pain, no palpitations  Abdomen: No nausea, vomiting, constipation or diarrhea  : No dysuria, no hematuria  Skin: No rashes  Lymph: No swelling  Neuro: No parathesia, no weakness  Psych: Normal though content, no hallucinations, no SI/HI    Objective   Physical Exam  Vitals:    03/13/17 1416   BP: 106/67   Pulse: 66   Weight: 172 lb (78 kg)   Height: 68\" (172.7 cm)   Gen: No acute distress, awake and oriented times three  Abdomen: soft, nontender, non distended, normoactive bowel sounds  Pelvic:   Normal external female " genitalia, no lesions  Vagina: No blood or discharge  Cervix: No cervical motion tenderness, no lesions, no active bleeding, nonfriable  Uterus: Anteverted, normal size and shape, nontender  Adnexa: No masses or tenderness  Rectal: Deferred  Psych: Good judgement and insight, normal affect and mood      Assessment/Plan   Sania was seen today for postpartum care.    Diagnoses and all orders for this visit:    Postpartum exam    Birth control counseling    Patient is doing well today.  She may resume normal activities.  She is encouraged to either use abstinence or exclusively condoms until we're able to place her Nexplanon.  We discussed birth control options at length today.  The risks, benefits, alternatives, and side effects of various methods were discussed.  The patient would like to proceed with a Nexplanon contraceptive implant.  Educational handouts are given to the patient.  All her questions are answered.  She should return to see me as soon as possible for Nexplanon insertion.    I spent 10 out of 15 minutes with the patient in face to face counseling of the above issues.

## 2017-03-21 ENCOUNTER — PROCEDURE VISIT (OUTPATIENT)
Dept: OBSTETRICS AND GYNECOLOGY | Facility: CLINIC | Age: 27
End: 2017-03-21

## 2017-03-21 VITALS
HEART RATE: 56 BPM | BODY MASS INDEX: 26.22 KG/M2 | DIASTOLIC BLOOD PRESSURE: 50 MMHG | WEIGHT: 173 LBS | SYSTOLIC BLOOD PRESSURE: 108 MMHG | HEIGHT: 68 IN

## 2017-03-21 DIAGNOSIS — B00.2 RECURRENT ORAL HERPES SIMPLEX: ICD-10-CM

## 2017-03-21 DIAGNOSIS — Z30.013 ENCOUNTER FOR INITIAL PRESCRIPTION OF INJECTABLE CONTRACEPTIVE: Primary | ICD-10-CM

## 2017-03-21 PROBLEM — Z30.09 BIRTH CONTROL COUNSELING: Status: RESOLVED | Noted: 2017-03-13 | Resolved: 2017-03-21

## 2017-03-21 PROCEDURE — 99213 OFFICE O/P EST LOW 20 MIN: CPT | Performed by: OBSTETRICS & GYNECOLOGY

## 2017-03-21 PROCEDURE — 96372 THER/PROPH/DIAG INJ SC/IM: CPT | Performed by: OBSTETRICS & GYNECOLOGY

## 2017-03-21 RX ORDER — VALACYCLOVIR HYDROCHLORIDE 1 G/1
2000 TABLET, FILM COATED ORAL 2 TIMES DAILY
Qty: 4 TABLET | Refills: 3 | Status: SHIPPED | OUTPATIENT
Start: 2017-03-21 | End: 2017-03-22

## 2017-03-21 RX ORDER — MEDROXYPROGESTERONE ACETATE 150 MG/ML
150 INJECTION, SUSPENSION INTRAMUSCULAR ONCE
Status: DISCONTINUED | OUTPATIENT
Start: 2017-03-21 | End: 2017-03-21

## 2017-03-21 RX ORDER — MEDROXYPROGESTERONE ACETATE 150 MG/ML
150 INJECTION, SUSPENSION INTRAMUSCULAR
Qty: 1 ML | Refills: 3 | Status: SHIPPED | OUTPATIENT
Start: 2017-06-05 | End: 2018-09-07

## 2017-03-21 RX ORDER — MEDROXYPROGESTERONE ACETATE 150 MG/ML
150 INJECTION, SUSPENSION INTRAMUSCULAR ONCE
Status: COMPLETED | OUTPATIENT
Start: 2017-03-21 | End: 2017-03-21

## 2017-03-21 RX ADMIN — MEDROXYPROGESTERONE ACETATE 150 MG: 150 INJECTION, SUSPENSION INTRAMUSCULAR at 14:48

## 2017-03-21 NOTE — PROGRESS NOTES
Procedure   Procedures   pt here for Nexplanon insertion.     Void this procedure note.  The patient elected not to proceed with Nexplanon insertion.

## 2017-03-21 NOTE — PROGRESS NOTES
"Subjective   Sania Mago Monroe is a 26 y.o. female   CC: Birth control    History of Present Illness  Patient initially was here today for a Nexplanon insertion; however, the patient has decided during her visit today that she does not want to have Nexplanon placed.  She has concerns about foreign body effect.  The patient would prefer to start Depo-Provera injections today.  She reports that she has used Depo-Provera extensively in the past.  Her only issue with it was causing facial breakout.  The patient also request a prescription for Valtrex for recurrent cold sores.  She reports that she gets about 2 per year.  She is previously used this medication in the past.    The following portions of the patient's history were reviewed and updated as appropriate: allergies, current medications, past family history, past medical history, past social history, past surgical history and problem list.    Review of Systems  General: No fever or chills  Constitutional: No weight loss or gain, no hair loss  HENT: No headache, no hearing loss, no tinnitus  Eyes: normal vision, no eye pain  Lungs: No cough, no shortness of breath  Heart: No chest pain, no palpitations  Abdomen: No nausea, vomiting, constipation or diarrhea  : No dysuria, no hematuria  Skin: No rashes  Lymph: No swelling  Neuro: No parathesia, no weakness  Psych: Normal though content, no hallucinations, no SI/HI    Objective   Physical Exam  Vitals:    03/21/17 1353   BP: 108/50   Pulse: 56   Weight: 173 lb (78.5 kg)   Height: 68\" (172.7 cm)   Gen: No acute distress, awake and oriented times three  Pelvic:  Deferred  Psych: Good judgement and insight, normal affect and mood      Assessment/Plan   Diagnoses and all orders for this visit:    Encounter for initial prescription of injectable contraceptive  -     medroxyPROGESTERone (DEPO-PROVERA) injection 150 mg; Inject 1 mL into the shoulder, thigh, or buttocks 1 (One) Time.  -     medroxyPROGESTERone " (DEPO-PROVERA) 150 MG/ML injection; Inject 1 mL into the shoulder, thigh, or buttocks Every 3 (Three) Months.    Recurrent oral herpes simplex  -     valACYclovir (VALTREX) 1000 MG tablet; Take 2 tablets by mouth 2 (Two) Times a Day for 1 day.    Contraceptive options discussed with the patient at length.  She instead of using Nexplanon wishes to start Depo-Provera injections today.  She'll receive her first injection today.  I gave the patient instructions in the future to  her Depo-Provera from the pharmacy every 3 months and bring it with her to her appointments.  We discussed the risks, benefits, alternatives, and side effects.  All her questions are answered.  We will also start Valtrex as needed for oral herpes outbreaks.  The patient verbalized understanding.  Patient should otherwise return to the office to see me around 1 year for an annual exam.    I spent 12 out of 15 minutes with the patient in face to face counseling of the above issues.

## 2017-08-09 ENCOUNTER — TELEPHONE (OUTPATIENT)
Dept: OBSTETRICS AND GYNECOLOGY | Facility: CLINIC | Age: 27
End: 2017-08-09

## 2017-08-09 NOTE — TELEPHONE ENCOUNTER
Called pt about no-show on 8/8/17. Pt states she had called the office to cancel, she is on vacation.  She will call back to [email or web address]

## 2018-09-07 ENCOUNTER — OFFICE VISIT (OUTPATIENT)
Dept: OBSTETRICS AND GYNECOLOGY | Facility: CLINIC | Age: 28
End: 2018-09-07

## 2018-09-07 VITALS
HEART RATE: 65 BPM | SYSTOLIC BLOOD PRESSURE: 112 MMHG | DIASTOLIC BLOOD PRESSURE: 62 MMHG | WEIGHT: 163 LBS | BODY MASS INDEX: 24.71 KG/M2 | HEIGHT: 68 IN

## 2018-09-07 DIAGNOSIS — Z12.4 SCREENING FOR CERVICAL CANCER: ICD-10-CM

## 2018-09-07 DIAGNOSIS — Z11.3 SCREEN FOR STD (SEXUALLY TRANSMITTED DISEASE): ICD-10-CM

## 2018-09-07 DIAGNOSIS — Z01.419 ENCOUNTER FOR GYNECOLOGICAL EXAMINATION: Primary | ICD-10-CM

## 2018-09-07 DIAGNOSIS — Z30.09 BIRTH CONTROL COUNSELING: ICD-10-CM

## 2018-09-07 PROCEDURE — 99213 OFFICE O/P EST LOW 20 MIN: CPT | Performed by: OBSTETRICS & GYNECOLOGY

## 2018-09-07 NOTE — PROGRESS NOTES
"Subjective   Sania Mago Monroe is a 27 y.o. female.   CC: Pt here for bc.   History of Present Illness   Pt here for bc.  She was originally scheduled for her annual exam, but she has her young child with her and she would prefer to wait until next week when she can have childcare to have the annual exam performed.  Today she would just like to discuss birth control.  She is interested in long-acting birth control.  She states she is most interested in ParaGard.  She had previously used Depo-Provera.  Her last shot was about 3 months ago.  She has started having periods again.  She reports her periods are regular.  She is currently using condoms as a method of contraception.    Current Outpatient Prescriptions on File Prior to Visit   Medication Sig   • [DISCONTINUED] medroxyPROGESTERone (DEPO-PROVERA) 150 MG/ML injection Inject 1 mL into the shoulder, thigh, or buttocks Every 3 (Three) Months.   • [DISCONTINUED] Prenatal Vit-Fe Fumarate-FA (PRENATAL VITAMINS) 28-0.8 MG tablet      No current facility-administered medications on file prior to visit.      The following portions of the patient's history were reviewed and updated as appropriate: allergies, current medications, past family history, past medical history, past social history, past surgical history and problem list.    Review of Systems  General: No fever or chillsation or diarrhea  : No dysuria, no hematuria  Psych: Normal though content, no hallucinations, no SI/HI    Objective   Physical Exam  Vitals:    09/07/18 1126   BP: 112/62   Pulse: 65   Weight: 73.9 kg (163 lb)   Height: 172.7 cm (68\")   Patient's last menstrual period was 08/28/2018 (exact date).   Gen.: No acute distress, awake and oriented ×3  HEENT: Normocephalic, atraumatic, moist because membranes  Psychiatric: Good judgment and insight, normal affect and mood  Neurologic: Cranial nerves II through XII intact, no gross deficits    Assessment/Plan   Diagnoses and all orders for this " visit:      Birth control counseling    We have discussed contraceptive options at length.  She is most interested in long-acting reversible contraception.  We discussed both hormonal and copper IUD is a Nexplanon.  The patient is interested in the copper IUD.  The risks, benefits, alternatives, and side effects were discussed.  We discussed that she would expect to continue to have periods with the copper IUD.  She would like to use this method as her form of birth control.  Educational materials were provided to the patient in her native language of Hebrew.  The patient will reschedule her annual exam until next week.  She is to call us when her period starts to schedule ParaGard insertion.  She is instructed to take ibuprofen just prior to her visit at the time of ParaGard insertion.    I spent 12 out of 15 minutes with the patient in face to face counseling of the above issues.

## 2018-09-11 ENCOUNTER — PROCEDURE VISIT (OUTPATIENT)
Dept: OBSTETRICS AND GYNECOLOGY | Facility: CLINIC | Age: 28
End: 2018-09-11

## 2018-09-11 VITALS
SYSTOLIC BLOOD PRESSURE: 107 MMHG | HEIGHT: 68 IN | DIASTOLIC BLOOD PRESSURE: 70 MMHG | BODY MASS INDEX: 25.01 KG/M2 | HEART RATE: 61 BPM | WEIGHT: 165 LBS

## 2018-09-11 DIAGNOSIS — Z30.430 ENCOUNTER FOR IUD INSERTION: ICD-10-CM

## 2018-09-11 DIAGNOSIS — Z30.431 IUD CHECK UP: Primary | ICD-10-CM

## 2018-09-11 PROCEDURE — 76998 US GUIDE INTRAOP: CPT | Performed by: OBSTETRICS & GYNECOLOGY

## 2018-09-11 PROCEDURE — 58300 INSERT INTRAUTERINE DEVICE: CPT | Performed by: OBSTETRICS & GYNECOLOGY

## 2018-09-11 NOTE — PROGRESS NOTES
Procedure   Procedures   pt here for IUD insertion.     Procedure: Paraguard Intrauterine device insertion  2.  ParaGard removal  3.  Insertion of another ParaGard under ultrasound guidance  Preoperative diagnosis: Encounter for intrauterine device insertion  Postoperative diagnosis: Same  Indications: Patient requested information on long-acting reversible contraception with ParaGard.  The risks, benefits, alternatives, and side effects of the device have been discussed at length with the patient.  She verbalizes understanding wishes to proceed with ParaGard insertion today.  Findings: Uterus anteverted and about 8 weeks' size.  Estimated blood loss: Less than 5 mL  Pathology: None  Compilations: None    Procedure in detail:  The risks, benefits, and alternatives to Paraguard were explained at length with the patient. All her questions were answered and consents were signed.  The patient was placed in a dorsal lithotomy position on the examining table in [facility]. A bimanual exam confirmed the uterus was normal in size, anteverted, and midplane. A warmed metal speculum was inserted into the vagina and the cervix was brought into view. The cervix was prepped with Betadine. The anterior lip was grasped with a single-tooth tenaculum. The endometrial cavity was then sounded to 8 cm without use of a dilator. Gloves were then exchanged for sterile gloves. This sealed Paraguard package was opened and the device was removed in a sterile fashion.  The upper edge of the depth setting the flange was set at a uterine sound measured. The  was then carefully advanced to the cervical canal.  There was some difficulty with passing the ParaGard  through the cervix to the level of the fundus.  I was able to advance the ParaGard to about 6 cm, but I do not feel that I was to the level of the cervix.  I attempted to remove the  to try to replace it, but when I retracted the , the IUD came out within the  uterus.  I had concerns of the IUD was not all the way at the fundus.  The patient was not having any discomfort however.  I decided to have the patient have an ultrasound today to check IUD location.  If the IUD is at the fundus, no further follow-up is necessary.  If the IUD appears low, we can remove the IUD today and then replace the IUD under ultrasound guidance to confirm placement at the level of the fundus.  The patient was sent for ultrasound.  Ultrasound findings today confirmed placement of the IUD within the cervix.  Ariton speculum was replaced in the vagina.  The cervix was visualized.  The strings of the IUD were grasped with the dressing forceps and the IUD was easily removed and discarded.  Now, while watching in real time with an abdominal ultrasound, a second ParaGard IUD  was passed through the cervix to the level of the uterine fundus.  This was a completely new ParaGard device.  This was then backed off about 1.5-2 cm to allow sufficient space for the arms to open. The plunger was placed and the sleeve gently retracted and deployed the device. The  was removed carefully from the uterus. The threads were then cut leaving 2-3 cm visible outside of the cervix.  Ultrasound at this time showed the IUD to be at the level of the fundus.  The single-tooth tenaculum was removed from the anterior lip. Good hemostasis was noted. All other instruments were removed from the vagina. There were no complications, and the patient tolerated the procedure well with a minimal amount of discomfort. The patient was counseled about the need to return in 2 weeks for string check. She was counseled about the need to use a backup method of contraception such as condoms until her post insertion exam was performed. The patient verbalized understanding that the Paraguard will need to be removed/replaced after 10 years. The patient is counseled to contact us if she has any significant or increasing  bleeding, pain, fever, chills, or other concerns. She is instructed to see a doctor right away if she believes that she may be pregnant at any time with the IUD in place.

## 2018-10-03 ENCOUNTER — TELEPHONE (OUTPATIENT)
Dept: OBSTETRICS AND GYNECOLOGY | Facility: CLINIC | Age: 28
End: 2018-10-03

## 2018-10-03 ENCOUNTER — OFFICE VISIT (OUTPATIENT)
Dept: OBSTETRICS AND GYNECOLOGY | Facility: CLINIC | Age: 28
End: 2018-10-03

## 2018-10-03 VITALS
DIASTOLIC BLOOD PRESSURE: 64 MMHG | HEART RATE: 65 BPM | SYSTOLIC BLOOD PRESSURE: 102 MMHG | BODY MASS INDEX: 24.86 KG/M2 | HEIGHT: 68 IN | WEIGHT: 164 LBS

## 2018-10-03 DIAGNOSIS — T83.32XA MALPOSITIONED INTRAUTERINE DEVICE (IUD), INITIAL ENCOUNTER: ICD-10-CM

## 2018-10-03 DIAGNOSIS — Z01.419 ENCOUNTER FOR GYNECOLOGICAL EXAMINATION: Primary | ICD-10-CM

## 2018-10-03 DIAGNOSIS — Z30.09 BIRTH CONTROL COUNSELING: ICD-10-CM

## 2018-10-03 DIAGNOSIS — Z11.3 SCREEN FOR STD (SEXUALLY TRANSMITTED DISEASE): ICD-10-CM

## 2018-10-03 DIAGNOSIS — Z30.013 ENCOUNTER FOR INITIAL PRESCRIPTION OF INJECTABLE CONTRACEPTIVE: Primary | ICD-10-CM

## 2018-10-03 DIAGNOSIS — Z30.432 ENCOUNTER FOR IUD REMOVAL: ICD-10-CM

## 2018-10-03 PROBLEM — Z30.430 ENCOUNTER FOR IUD INSERTION: Status: RESOLVED | Noted: 2018-09-11 | Resolved: 2018-10-03

## 2018-10-03 PROCEDURE — 58301 REMOVE INTRAUTERINE DEVICE: CPT | Performed by: OBSTETRICS & GYNECOLOGY

## 2018-10-03 PROCEDURE — 99395 PREV VISIT EST AGE 18-39: CPT | Performed by: OBSTETRICS & GYNECOLOGY

## 2018-10-03 RX ORDER — MEDROXYPROGESTERONE ACETATE 150 MG/ML
150 INJECTION, SUSPENSION INTRAMUSCULAR
Qty: 1 ML | Refills: 3 | Status: SHIPPED | OUTPATIENT
Start: 2018-10-03 | End: 2019-03-21 | Stop reason: SDUPTHER

## 2018-10-03 NOTE — TELEPHONE ENCOUNTER
Okay.  Kelsy, you can cancel her Nexplanon order.  I sent in a prescription for Depo-Provera.  Please get her on the schedule for injections.

## 2018-10-03 NOTE — PROGRESS NOTES
"Subjective   Sania Mago Monroe is a 27 y.o. female.   CC: Pt here for annual and std check.  History of Present Illness   Pt here for annual and std check.  She is here for annual exam at this time.  She had a ParaGard IUD inserted under ultrasound guidance about 3 weeks ago.  She is not having any complaints at this time.  She has not had any bleeding since her IUD was inserted.  She denies pelvic pain.  Of note, she is still breast-feeding her 20-month-old infant.  Patient does not routinely perform self breast exams.  She is not a smoker.  She declines STD blood work.    Current Outpatient Prescriptions on File Prior to Visit   Medication Sig Dispense Refill   • PARAGARD INTRAUTERINE COPPER IU by Intrauterine route.       No current facility-administered medications on file prior to visit.      No Known Allergies     Social History   Substance Use Topics   • Smoking status: Never Smoker   • Smokeless tobacco: Never Used   • Alcohol use No     The following portions of the patient's history were reviewed and updated as appropriate: allergies, current medications, past family history, past medical history, past social history, past surgical history and problem list.    Review of Systems  General: No fever or chills  Constitutional: No weight loss or gain, no hair loss  HENT: No headache, no hearing loss, no tinnitus  Eyes: normal vision, no eye pain  Lungs: No cough, no shortness of breath  Heart: No chest pain, no palpitations  Abdomen: No nausea, vomiting, constipation or diarrhea  : No dysuria, no hematuria  Skin: No rashes  Lymph: No swelling  Neuro: No parathesia, no weakness  Psych: Normal though content, no hallucinations, no SI/HI    Objective   Physical Exam  Vitals:    10/03/18 1334   BP: 102/64   Pulse: 65   Weight: 74.4 kg (164 lb)   Height: 172.7 cm (68\")   Patient's last menstrual period was 09/12/2018 (exact date).    Exam performed in the presence of a female chaperone  Patient has provided " verbal consent to proceed with exam.    Gen: No acute distress, awake and oriented times three  HENT: Normocephalic, atraumatic, Moist mucous membranes  Eyes: PERRLA, EOMI  Neck: Supple, normal range of motion, no thyromegaly  Lungs: Normal work of breathing, lungs clear bilaterally, no crackles/wheezes  Heart: Regular rate and rhythm, no murmurs  Abdomen: soft, nontender, non distended, normoactive bowel sounds  Breast: Symmetrical. No skin changes or nipple retractions. No lumps or masses bilaterally. No tenderness bilaterally.  Pelvic:   Normal external female genitalia, no lesions  Vagina: No blood or discharge  Cervix: No cervical motion tenderness, no lesions, no active bleeding, nonfriable, IUD strings are extremely long, and in fact the lower aspect of the IUD is seen protruding from the external cervical os  Uterus: Anteverted, normal size and shape, nontender  Adnexa: No masses or tenderness  Rectal: Deferred  Skin: Warm and dry, no rashes  Psych: Good judgement and insight, normal affect and mood  Neuro: CN 2-12 intact, no gross deficits    Assessment/Plan   Diagnoses and all orders for this visit:    Encounter for gynecological examination  Patient was here for her routine prenatal visit today.  During exam, it was noted that her IUD appears to be within the cervix and even the lower tip protruding from the cervix.  As this is a ParaGard IUD does not likely to be effective in this location.  The IUD will need to be removed.  Please see separate procedure note for details.  Pap smear was last performed in 2016 and is up-to-date.  She declines STD blood work.  Patient is counseled on performing routine self breast exams.  Return to the office in one year for annual exam or sooner as needed.  Screen for STD (sexually transmitted disease)  -     Chlamydia trachomatis, Neisseria gonorrhoeae, Trichomonas vaginalis, PCR - Swab, Vagina    Malpositioned intrauterine device (IUD), initial encounter  See IUD  removal note  Encounter for IUD removal    Birth control counseling  Patient is had difficulty with this IUD.  We have discussed alternative forms of contraception at length.  Patient is desiring long-acting contraception.  We discussed other alternatives, and she is interested in Nexplanon.  Risks, benefits, alternatives, and side effects including irregular bleeding were discussed.  She would like to have the next month.  We will obtain prior authorization and have her return after we have received the device.

## 2018-10-03 NOTE — PROGRESS NOTES
Procedure: Paragard Intrauterine device removal  Preoperative diagnosis: Malpositioned IUD  2.  Encounter for IUD removal  Postoperative diagnosis: Same  Indications: Patient was here for annual exam and was found to have the lower edge of the ParaGard IUD protruding through the external os of the cervix.  It is felt that this is not likely effective form of contraception.  I recommended IUD removal.  Anesthesia: None  Pathology: None  Estimated blood loss: Less than 5 mL  Complications: None    Procedure in detail:  Patient placed in lithotomy position in stirrups. Hertel speculum placed into vagina. Cervix was visualized. IUD strings were seen at the external os. Strings were grasped with a ring forceps and the IUD was easily removed with slow, steady, gentle tension. IUD was removed intact and discarded. No complications. Patient tolerated the procedure well. She was instructed to use condoms or abstinence as contraception until we have obtained obtained her Nexplanon.

## 2018-10-05 LAB
C TRACH RRNA VAG QL NAA+PROBE: NEGATIVE
N GONORRHOEA RRNA VAG QL NAA+PROBE: NEGATIVE
T VAGINALIS RRNA VAG QL NAA+PROBE: NEGATIVE

## 2018-10-09 ENCOUNTER — TELEPHONE (OUTPATIENT)
Dept: OBSTETRICS AND GYNECOLOGY | Facility: CLINIC | Age: 28
End: 2018-10-09

## 2018-10-09 ENCOUNTER — CLINICAL SUPPORT (OUTPATIENT)
Dept: OBSTETRICS AND GYNECOLOGY | Facility: CLINIC | Age: 28
End: 2018-10-09

## 2018-10-09 VITALS
WEIGHT: 165 LBS | BODY MASS INDEX: 25.01 KG/M2 | DIASTOLIC BLOOD PRESSURE: 71 MMHG | HEIGHT: 68 IN | SYSTOLIC BLOOD PRESSURE: 121 MMHG | HEART RATE: 63 BPM

## 2018-10-09 DIAGNOSIS — Z30.42 ENCOUNTER FOR MANAGEMENT AND INJECTION OF DEPO-PROVERA: Primary | ICD-10-CM

## 2018-10-09 PROCEDURE — 96372 THER/PROPH/DIAG INJ SC/IM: CPT | Performed by: OBSTETRICS & GYNECOLOGY

## 2018-10-09 RX ORDER — MEDROXYPROGESTERONE ACETATE 150 MG/ML
150 INJECTION, SUSPENSION INTRAMUSCULAR ONCE
Status: COMPLETED | OUTPATIENT
Start: 2018-10-09 | End: 2018-10-09

## 2018-10-09 RX ADMIN — MEDROXYPROGESTERONE ACETATE 150 MG: 150 INJECTION, SUSPENSION INTRAMUSCULAR at 14:41

## 2018-10-09 NOTE — PROGRESS NOTES
(Patient supplied)  Patient here for depo injection. Patient did not have a reaction to the injection or medication. Injection on the right deltoid.  LOT: [id number]   EXP: 02/2021

## 2018-10-09 NOTE — TELEPHONE ENCOUNTER
----- Message from Olegario Dumont MD sent at 10/9/2018  9:40 AM EDT -----  Notify the patient that her gonorrhea/chlamydia cultures were normal

## 2018-10-29 ENCOUNTER — TELEPHONE (OUTPATIENT)
Dept: OBSTETRICS AND GYNECOLOGY | Facility: CLINIC | Age: 28
End: 2018-10-29

## 2018-10-29 NOTE — TELEPHONE ENCOUNTER
----- Message from Mendez Mejia sent at 10/26/2018 12:55 PM EDT -----  Patient has been bleeding non-stop since she got her depo on 10/9. She says its not very heavy, its like a normal period. I told patient that abnormal bleeding can be common with depo. She wants to know from you if this is normal or if there is anything she can do to stop it. Please advise.    Thanks,  Mendez

## 2018-10-29 NOTE — TELEPHONE ENCOUNTER
This is quite normal, and even expected with Depo-Provera.  I think she just needs to give it some time.  Usually about 3 months out the bleeding is stopping altogether, and will likely become less frequent in the meantime.

## 2018-12-27 ENCOUNTER — CLINICAL SUPPORT (OUTPATIENT)
Dept: OBSTETRICS AND GYNECOLOGY | Facility: CLINIC | Age: 28
End: 2018-12-27

## 2018-12-27 VITALS
SYSTOLIC BLOOD PRESSURE: 117 MMHG | WEIGHT: 162 LBS | HEIGHT: 68 IN | DIASTOLIC BLOOD PRESSURE: 68 MMHG | HEART RATE: 68 BPM | BODY MASS INDEX: 24.55 KG/M2

## 2018-12-27 DIAGNOSIS — Z30.42 DEPO-PROVERA CONTRACEPTIVE STATUS: Primary | ICD-10-CM

## 2018-12-27 PROCEDURE — 96372 THER/PROPH/DIAG INJ SC/IM: CPT | Performed by: OBSTETRICS & GYNECOLOGY

## 2018-12-27 RX ORDER — MEDROXYPROGESTERONE ACETATE 150 MG/ML
150 INJECTION, SUSPENSION INTRAMUSCULAR ONCE
Status: COMPLETED | OUTPATIENT
Start: 2018-12-27 | End: 2018-12-27

## 2018-12-27 RX ADMIN — MEDROXYPROGESTERONE ACETATE 150 MG: 150 INJECTION, SUSPENSION INTRAMUSCULAR at 14:10

## 2018-12-27 NOTE — PROGRESS NOTES
Subjective   Sania Monroe is a 28 y.o. female. Here for depo provera inj. Lot # [id number] exp 7*/2020 ndc [id number]. Pt did not have a reaction.    History of Present Illness        Review of Systems    Objective   Physical Exam    Assessment/Plan   There are no diagnoses linked to this encounter.

## 2019-03-21 ENCOUNTER — CLINICAL SUPPORT (OUTPATIENT)
Dept: OBSTETRICS AND GYNECOLOGY | Facility: CLINIC | Age: 29
End: 2019-03-21

## 2019-03-21 VITALS
DIASTOLIC BLOOD PRESSURE: 71 MMHG | BODY MASS INDEX: 25.61 KG/M2 | WEIGHT: 169 LBS | HEART RATE: 74 BPM | HEIGHT: 68 IN | SYSTOLIC BLOOD PRESSURE: 117 MMHG

## 2019-03-21 DIAGNOSIS — Z30.013 ENCOUNTER FOR INITIAL PRESCRIPTION OF INJECTABLE CONTRACEPTIVE: Primary | ICD-10-CM

## 2019-03-21 PROCEDURE — 96372 THER/PROPH/DIAG INJ SC/IM: CPT | Performed by: OBSTETRICS & GYNECOLOGY

## 2019-03-21 RX ORDER — MEDROXYPROGESTERONE ACETATE 150 MG/ML
150 INJECTION, SUSPENSION INTRAMUSCULAR ONCE
Status: COMPLETED | OUTPATIENT
Start: 2019-03-21 | End: 2019-03-21

## 2019-03-21 RX ADMIN — MEDROXYPROGESTERONE ACETATE 150 MG: 150 INJECTION, SUSPENSION INTRAMUSCULAR at 10:52

## 2020-06-18 NOTE — TELEPHONE ENCOUNTER
Pt. called to say she would like to have the Depo shot instead of the Nexplanon.   hard copy, drawn during this pregnancy

## 2024-07-18 NOTE — PROGRESS NOTES
Pt received depo lot# [id number] exp: 7/2019. Rt deltoid. No reaction. TREVIN   Refill approved as requested.